# Patient Record
Sex: FEMALE | Race: WHITE
[De-identification: names, ages, dates, MRNs, and addresses within clinical notes are randomized per-mention and may not be internally consistent; named-entity substitution may affect disease eponyms.]

---

## 2017-02-24 ENCOUNTER — HOSPITAL ENCOUNTER (OUTPATIENT)
Dept: HOSPITAL 47 - LABWHC1 | Age: 22
Discharge: HOME | End: 2017-02-24
Payer: COMMERCIAL

## 2017-02-24 DIAGNOSIS — Z3A.00: ICD-10-CM

## 2017-02-24 DIAGNOSIS — Z34.82: Primary | ICD-10-CM

## 2017-02-24 LAB
CH: 29.8
CHCM: 33.7
ERYTHROCYTE [DISTWIDTH] IN BLOOD BY AUTOMATED COUNT: 3.96 M/UL (ref 3.8–5.4)
ERYTHROCYTE [DISTWIDTH] IN BLOOD: 14.3 % (ref 11.5–15.5)
GLUCOSE SERPL-MCNC: 99 MG/DL (ref 74–99)
HCT VFR BLD AUTO: 35.2 % (ref 34–46)
HDW: 2.68
HEPATITIS B SURFACE AG INDEX: 0.07
HGB BLD-MCNC: 11.5 GM/DL (ref 11.4–16)
MCH RBC QN AUTO: 29.1 PG (ref 25–35)
MCHC RBC AUTO-ENTMCNC: 32.7 G/DL (ref 31–37)
MCV RBC AUTO: 89 FL (ref 80–100)
NON-AFRICAN AMERICAN GFR(MDRD): >60
WBC # BLD AUTO: 8.6 K/UL (ref 4–11)

## 2017-02-24 PROCEDURE — 87389 HIV-1 AG W/HIV-1&-2 AB AG IA: CPT

## 2017-02-24 PROCEDURE — 86778 TOXOPLASMA ANTIBODY IGM: CPT

## 2017-02-24 PROCEDURE — 86901 BLOOD TYPING SEROLOGIC RH(D): CPT

## 2017-02-24 PROCEDURE — 86780 TREPONEMA PALLIDUM: CPT

## 2017-02-24 PROCEDURE — 86777 TOXOPLASMA ANTIBODY: CPT

## 2017-02-24 PROCEDURE — 86900 BLOOD TYPING SEROLOGIC ABO: CPT

## 2017-02-24 PROCEDURE — 36415 COLL VENOUS BLD VENIPUNCTURE: CPT

## 2017-02-24 PROCEDURE — 82565 ASSAY OF CREATININE: CPT

## 2017-02-24 PROCEDURE — 86850 RBC ANTIBODY SCREEN: CPT

## 2017-02-24 PROCEDURE — 87340 HEPATITIS B SURFACE AG IA: CPT

## 2017-02-24 PROCEDURE — 86762 RUBELLA ANTIBODY: CPT

## 2017-02-24 PROCEDURE — 85027 COMPLETE CBC AUTOMATED: CPT

## 2017-02-24 PROCEDURE — 82947 ASSAY GLUCOSE BLOOD QUANT: CPT

## 2017-02-25 LAB — T GONDII IGG SER-ACNC: <3 IU/ML (ref ?–7.2)

## 2017-02-27 LAB
HIV-1/HIV-2 AB SCREEN: (no result)
HIV1D: (no result)
HIV2D: (no result)

## 2017-03-02 ENCOUNTER — HOSPITAL ENCOUNTER (OUTPATIENT)
Dept: HOSPITAL 47 - RADUSWWP | Age: 22
End: 2017-03-02
Payer: COMMERCIAL

## 2017-03-02 DIAGNOSIS — O36.62X0: Primary | ICD-10-CM

## 2017-03-02 DIAGNOSIS — Z3A.18: ICD-10-CM

## 2017-03-02 PROCEDURE — 76811 OB US DETAILED SNGL FETUS: CPT

## 2017-03-02 NOTE — US
EXAMINATION TYPE: US OB fetal anatomy transabd

 

DATE OF EXAM: 3/2/2017 5:08 PM

 

COMPARISON: NONE

 

HISTORY: Anatomy exam

 

TECHNIQUE:  OBTA

 

EXAM MEASUREMENTS:

 

GESTATIONAL AGE / DATING

Physician Established: (19 weeks/0 days)

** EDC:  07/27/2017

Dates by LMP: unknown

Dates by First Scan:  (18 weeks/1 days)

** EDC: 08/02/2017

Dates by Current Scan for:  (18 weeks/2 days)

** EDC: 08/01/2017

 

FETAL SURVEY

IUP:  Single

PLACENTA: Posterior     

PREVIA: No previa   

** YOLANDA: 10.0 cm Normal

CERVICAL LENGTH (transabdominal: norm > 3.0cm): 4.8 cm

 

 

FETAL BIOMETRY

PRESENTATION:   Variable

BPD: 4.0 cm

**  18 weeks / 1 days

HC: 15.7 cm

**  18 weeks / 4 days

AC: 13.5 cm

**  19 weeks / 0 days

FL: 2.7 cm

**  18 weeks / 1 days

ESTIMATED FETAL WEIGHT IN GRAMS:  246 grams

ESTIMATED FETAL WEIGHT IN LBS/OZS:  0 lbs. 9 oz. 

WEIGHT PERCENTAGE BASED ON ESTABLISHED DATE:  22 %

** HC/AC: 1.2 Normal

** FL/AC: 19.8  Normal

HEART RATE:  157 bpm 

RHYTHM: Normal

 

ANATOMY SEEN (within normal limits): 

* Lateral Vent (< 1 cm) 0.6 cm

* Cisterna Magna (< 1.1 cm) 0.3 cm

* Nuchal Fold (< 0.6 cm) 0.3 cm

* Cerebellum (varies with age) 1.9 cm

Choroid Plexus (bilateral)

Midline Falx 

Cavus Septi Pellucidi   

Four Chamber Heart

Outflow tracts:  LVOT/RVOT

Stomach

Situs

Nose / Lips 

Diaphragm 

Kidneys (bilateral) 

Bladder

Cord Insert 

Three Vessel Cord 

Longitudinal Spine 

Transverse Spine 

Arms (bilateral)

Legs (bilateral)

 

 

 

 

 IMPRESSION:  Viable 18w2d fetus seen and appears wnl

## 2017-04-17 ENCOUNTER — HOSPITAL ENCOUNTER (OUTPATIENT)
Dept: HOSPITAL 47 - FBPOP | Age: 22
Discharge: HOME | End: 2017-04-17
Payer: COMMERCIAL

## 2017-04-17 VITALS
TEMPERATURE: 96.7 F | RESPIRATION RATE: 16 BRPM | DIASTOLIC BLOOD PRESSURE: 57 MMHG | HEART RATE: 91 BPM | SYSTOLIC BLOOD PRESSURE: 110 MMHG

## 2017-04-17 DIAGNOSIS — Z3A.25: ICD-10-CM

## 2017-04-17 DIAGNOSIS — O23.42: Primary | ICD-10-CM

## 2017-04-17 LAB
PARTICLE COUNT: (no result)
PH UR: 8 [PH] (ref 5–8)
SP GR UR: 1.02 (ref 1–1.03)
SQUAMOUS UR QL AUTO: 11 /HPF (ref 0–4)
UA BILLING (MACRO VS. MICRO): (no result)
UROBILINOGEN UR QL STRIP: <2 MG/DL (ref ?–2)
WBC #/AREA URNS HPF: 22 /HPF (ref 0–5)

## 2017-04-17 PROCEDURE — 99213 OFFICE O/P EST LOW 20 MIN: CPT

## 2017-04-17 PROCEDURE — 81001 URINALYSIS AUTO W/SCOPE: CPT

## 2017-04-18 NOTE — P.MSEPDOC
Presenting Problems





- Arrival Data


Date of Arrival on Unit: 17


Time of Arrival on Unit: 21:45


Mode of Transport: Wheelchair





- Complaint


OB-Reason for Admission/Chief Complaint: Pain


Comment: sharp, shooting, constant vaginal pain





Prenatal Medical History





- Pregnancy Information


: 2


Para: 1


Term: 1


: 0


Abortions: Spontaneous or Elective: 0


Number of Living Children: 1





- Gestational Age


Expected Date of Delivery: 17


Gestational Age by NOHEMI (wks/days): 25 Weeks and 0 Days





- Prenatal History


Pregnancy Complications: Prior , Smoker


Comment: has not seen Dr Suárez since 





Review of Systems





- Review of Systems


Constitutional: No problems


Breast: No problems


ENT: No problems


Cardiovascular: No problems


Respiratory: No problems


Gastrointestinal: No problems


Genitourinary: No problems


Musculoskeletal: No problems


Neurological: No problems


Skin: No problems





Vital Signs





- Temperature


Temperature: 96.7 F


Temperature Source: Temporal Artery Scan





- Pulse


  ** Right


Pulse Rate: 91


Pulse Assessment Method: Pulse Oximetry





- Respirations


Respiratory Rate: 16


O2 Sat by Pulse Oximetry: 98





- Blood Pressure


  ** Right Arm


Blood Pressure: 110/57


Blood Pressure Mean: 74


Blood Pressure Source: Automatic Cuff





Medical Screen Scoring (Pre)





- Cervical Exam


Dilation: 0 cm = 0





- Uterine Contractions


Frequency: N/A


Duration: N/A


Intensity: N/A





- Maternal Vital Signs


Maternal Temperature: N/A


Signs of Preeclampsia: N/A


Maternal Respirations: N/A





- Maternal Trauma


Maternal Trauma: N/A





- Fetal Assessment


Baseline FHR: 150


Fetal Heart Rate - NICHD Category: Category I (Normal) = 0


NST: Reactive


Fetal Position: N/A


Fetal Station: N/A





- Total Score


Total Score (Pre): 0





- Level of Risk


Level of Risk: Low (0-5)





Physician Notification (Pre)





- Physician Notified


Physician Notified Date: 17


Physician Notified Time: 21:55


Spoke With: Dr Mota





- Notification Comment


Comment: check cervix, send UA, obtain reactive fhts, d/c home if close and UA 

is negative





Medical Screen Scoring (Post)





- Cervical Exam


Dilation: Exam Deferred





- Uterine Contractions


Frequency: N/A


Duration: N/A


Intensity: N/A





- Maternal Vital Signs


Maternal Temperature: N/A


Signs of Preeclampsia: N/A


Maternal Respirations: N/A





- Maternal Trauma


Maternal Trauma: N/A





- Fetal Assessment


Fetal Heart Rate: 145


Fetal Heart Rate - NICHD Category: Category I (Normal) = 0


NST: Reactive





- Total Score


Total Score (Post): 0





- Post Treatment Level of Risk


Post Treatment Level of Risk: Low (0-5)





Physician Notification (Post)





- Physician Notified


Physician Notified Date: 17


Physician Notified Time: 22:37


Physician/Practitioner Notified:: Dr Mota


New Order Received: Yes





- Notification Comment


Comment: send UA for C&S, have pt  abx in am at her pharmacy, d/c home 

with instructions to increase water intake and call office to schedule appt 

with dr Suárez





Disposition





- Disposition


OB Disposition: Discharge to home


Discharge Date: 17


Discharge Time: 22:45


I agree with the RN Medical Screening Exam: Yes


Risk & Benefit of care provided described in d/c instruction: Yes


Diagnosis: INFECT OF PRT URINARY TRACT IN PREGNANCY, SECOND TRIMESTER

## 2017-05-28 NOTE — P.MSEPDOC
Presenting Problems





- Arrival Data


Date of Arrival on Unit: 17


Time of Arrival on Unit: 21:45


Mode of Transport: Wheelchair





- Complaint


OB-Reason for Admission/Chief Complaint: Pain


Comment: sharp, shooting, constant vaginal pain





Prenatal Medical History





- Pregnancy Information


: 2


Para: 1


Term: 1


: 0


Abortions: Spontaneous or Elective: 0


Number of Living Children: 1





- Gestational Age


Expected Date of Delivery: 17


Gestational Age by NOHEMI (wks/days): 30 Weeks and 5 Days





- Prenatal History


Pregnancy Complications: Prior , Smoker


Comment: has not seen Dr Suárez since 





Review of Systems





- Review of Systems


Constitutional: No problems


Breast: No problems


ENT: No problems


Cardiovascular: No problems


Respiratory: No problems


Gastrointestinal: No problems


Genitourinary: No problems


Musculoskeletal: No problems


Neurological: No problems


Skin: No problems





Vital Signs





- Temperature


Temperature: 96.7 F


Temperature Source: Temporal Artery Scan





- Pulse


  ** Right


Pulse Rate: 91


Pulse Assessment Method: Pulse Oximetry





- Respirations


Respiratory Rate: 16


O2 Sat by Pulse Oximetry: 98





- Blood Pressure


  ** Right Arm


Blood Pressure: 110/57


Blood Pressure Mean: 74


Blood Pressure Source: Automatic Cuff





Medical Screen Scoring (Pre)





- Cervical Exam


Dilation: 0 cm = 0





- Uterine Contractions


Frequency: N/A


Duration: N/A


Intensity: N/A





- Maternal Vital Signs


Maternal Temperature: N/A


Signs of Preeclampsia: N/A


Maternal Respirations: N/A





- Maternal Trauma


Maternal Trauma: N/A





- Fetal Assessment


Baseline FHR: 150


Fetal Heart Rate - NICHD Category: Category I (Normal) = 0


NST: Reactive


Fetal Position: N/A


Fetal Station: N/A





- Total Score


Total Score (Pre): 0





- Level of Risk


Level of Risk: Low (0-5)





Physician Notification (Pre)





- Physician Notified


Physician Notified Date: 17


Physician Notified Time: 21:55


Spoke With: Dr Mota





- Notification Comment


Comment: check cervix, send UA, obtain reactive fhts, d/c home if close and UA 

is negative





Medical Screen Scoring (Post)





- Cervical Exam


Dilation: Exam Deferred





- Uterine Contractions


Frequency: N/A


Duration: N/A


Intensity: N/A





- Maternal Vital Signs


Maternal Temperature: N/A


Signs of Preeclampsia: N/A


Maternal Respirations: N/A





- Maternal Trauma


Maternal Trauma: N/A





- Fetal Assessment


Fetal Heart Rate: 145


Fetal Heart Rate - NICHD Category: Category I (Normal) = 0


NST: Reactive





- Total Score


Total Score (Post): 0





- Post Treatment Level of Risk


Post Treatment Level of Risk: Low (0-5)





Physician Notification (Post)





- Physician Notified


Physician Notified Date: 17


Physician Notified Time: 22:37


Physician/Practitioner Notified:: Dr Mota


New Order Received: Yes





- Notification Comment


Comment: send UA for C&S, have pt  abx in am at her pharmacy, d/c home 

with instructions to increase water intake and call office to schedule appt 

with dr Suárez





Disposition





- Disposition


OB Disposition: Discharge to home


Discharge Date: 17


Discharge Time: 22:45


I agree with the RN Medical Screening Exam: Yes


Risk & Benefit of care provided described in d/c instruction: Yes


Diagnosis: 30 WEEKS GESTATION OF PREGNANCY

## 2017-06-02 ENCOUNTER — HOSPITAL ENCOUNTER (EMERGENCY)
Dept: HOSPITAL 47 - EC | Age: 22
Discharge: HOME | End: 2017-06-02
Payer: COMMERCIAL

## 2017-06-02 ENCOUNTER — HOSPITAL ENCOUNTER (OUTPATIENT)
Dept: HOSPITAL 47 - LABWHC1 | Age: 22
Discharge: HOME | End: 2017-06-02
Payer: COMMERCIAL

## 2017-06-02 VITALS
HEART RATE: 80 BPM | DIASTOLIC BLOOD PRESSURE: 57 MMHG | TEMPERATURE: 98.3 F | RESPIRATION RATE: 16 BRPM | SYSTOLIC BLOOD PRESSURE: 99 MMHG

## 2017-06-02 DIAGNOSIS — Z3A.30: ICD-10-CM

## 2017-06-02 DIAGNOSIS — F17.200: ICD-10-CM

## 2017-06-02 DIAGNOSIS — Z34.83: Primary | ICD-10-CM

## 2017-06-02 DIAGNOSIS — O99.89: Primary | ICD-10-CM

## 2017-06-02 DIAGNOSIS — M54.16: ICD-10-CM
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
## 2017-06-02 DIAGNOSIS — Z79.899: ICD-10-CM

## 2017-06-02 DIAGNOSIS — O99.333: ICD-10-CM

## 2017-06-02 LAB
CH: 29.2
CHCM: 33.1
ERYTHROCYTE [DISTWIDTH] IN BLOOD BY AUTOMATED COUNT: 3.41 M/UL (ref 3.8–5.4)
ERYTHROCYTE [DISTWIDTH] IN BLOOD: 13.5 % (ref 11.5–15.5)
HCT VFR BLD AUTO: 30.2 % (ref 34–46)
HDW: 2.94
HGB BLD-MCNC: 10.1 GM/DL (ref 11.4–16)
MCH RBC QN AUTO: 29.6 PG (ref 25–35)
MCHC RBC AUTO-ENTMCNC: 33.4 G/DL (ref 31–37)
MCV RBC AUTO: 88.5 FL (ref 80–100)
WBC # BLD AUTO: 8.3 K/UL (ref 4–11)

## 2017-06-02 PROCEDURE — 85027 COMPLETE CBC AUTOMATED: CPT

## 2017-06-02 PROCEDURE — 36415 COLL VENOUS BLD VENIPUNCTURE: CPT

## 2017-06-02 PROCEDURE — 82950 GLUCOSE TEST: CPT

## 2017-06-02 PROCEDURE — 99283 EMERGENCY DEPT VISIT LOW MDM: CPT

## 2017-06-02 NOTE — ED
Lower Extremity Injury HPI





- General


Chief Complaint: Extremity Injury, Lower


Stated Complaint: Fall-7 months preg


Time Seen by Provider: 17 13:40


Source: patient, RN notes reviewed


Mode of arrival: ambulatory


Limitations: no limitations





- History of Present Illness


Initial Comments: 





21-year-old female presents emergency Department with chief complaint of left 

hip pain.  Patient states that she fell 2 weeks ago when she tripped over a 

baby gait.  Patient states she felt her left hip but did not have any pain at 

that time.  She states over the last few days that she noticed that she's had 

pain that radiates from her left low back down her leg and feels like it's in 

her hip.  Patient denies any bowel bladder incontinence or retention.  Denies 

any abdominal pain.  She states that she feels baby kick in a regular basis.  

She has an appointment with her OB on Monday.  Patient had no vaginal bleeding 

no vaginal discharge.  Patient is  A0.  She's been taking Tylenol 

intermittently with minimal help.  She states that heat makes it worse but has 

not tried any stretching with it.





- Related Data


 Home Medications











 Medication  Instructions  Recorded  Confirmed


 


Pnv,Calcium 72/Iron/Folic Acid 1 tab PO DAILY 17





[Prenatal Plus Tablet]   











 Allergies











Allergy/AdvReac Type Severity Reaction Status Date / Time


 


No Known Allergies Allergy   Verified 17 13:01














Review of Systems


ROS Statement: 


Those systems with pertinent positive or pertinent negative responses have been 

documented in the HPI.





ROS Other: All systems not noted in ROS Statement are negative.





Past Medical History


Past Medical History: No Reported History


History of Any Multi-Drug Resistant Organisms: None Reported


Past Surgical History: Orthopedic Surgery


Past Anesthesia/Blood Transfusion Reactions: No Reported Reaction


Past Psychological History: No Psychological Hx Reported


Smoking Status: Current every day smoker


Past Alcohol Use History: None Reported


Past Drug Use History: None Reported





General Exam


Limitations: no limitations


General appearance: alert, in no apparent distress


Respiratory exam: Present: normal lung sounds bilaterally.  Absent: respiratory 

distress, wheezes, rales, rhonchi, stridor


Cardiovascular Exam: Present: regular rate, normal rhythm, normal heart sounds.

  Absent: systolic murmur, diastolic murmur, rubs, gallop, clicks


GI/Abdominal exam: Present: soft, normal bowel sounds, other (Abdomen 

appropriate for gestational age).  Absent: distended, tenderness, guarding, 

rebound, rigid


Extremities exam: Present: normal inspection, full ROM, normal capillary refill

, other (Lower extremity strength equal bilaterally, neurovascular intact 

negative Humble's test).  Absent: tenderness, pedal edema, joint swelling, 

calf tenderness


Back exam: Present: full ROM, tenderness (Mild tenderness left lumbar region), 

paraspinal tenderness, other (Pain with left straight leg raise).  Absent: CVA 

tenderness (R), CVA tenderness (L), vertebral tenderness


Skin exam: Present: warm, dry, intact, normal color.  Absent: rash





Course


 Vital Signs











  17





  12:56


 


Temperature 98.3 F


 


Pulse Rate 80


 


Respiratory 16





Rate 


 


Blood Pressure 99/57


 


O2 Sat by Pulse 99





Oximetry 














Medical Decision Making





- Medical Decision Making





21-year-old female presented for left low back pain, left hip pain.  Patient 

appears to have lumbar radiculopathy.  She's had immediate pain from her left 

low back down her leg.  She had a normal Humble's test with a positive 

straight leg raise.  Patient is advised to apply heat and ice and do daily 

stretching and continue acetaminophen.  She is to see her OB on Monday for 

further pain meds as approved by OB.





Disposition


Clinical Impression: 


 Lumbar radiculopathy, acute, Left hip pain





Disposition: HOME SELF-CARE


Condition: Stable


Instructions:  Lumbar Radiculopathy (ED)


Additional Instructions: 


Please return to the Emergency Department if symptoms worsen or any other 

concerns.


Referrals: 


None,Stated [Primary Care Provider] - 1-2 days


Time of Disposition: 13:54

## 2017-07-08 ENCOUNTER — HOSPITAL ENCOUNTER (OUTPATIENT)
Dept: HOSPITAL 47 - FBPOP | Age: 22
Discharge: HOME | End: 2017-07-08
Payer: COMMERCIAL

## 2017-07-08 VITALS
HEART RATE: 87 BPM | RESPIRATION RATE: 16 BRPM | TEMPERATURE: 96.6 F | DIASTOLIC BLOOD PRESSURE: 58 MMHG | SYSTOLIC BLOOD PRESSURE: 108 MMHG

## 2017-07-08 DIAGNOSIS — Z34.93: Primary | ICD-10-CM

## 2017-07-08 LAB
PARTICLE COUNT: 9844
PH UR: 6.5 [PH] (ref 5–8)
PROT UR QL: (no result)
RBC UR QL: 1 /HPF (ref 0–5)
SP GR UR: 1.02 (ref 1–1.03)
SQUAMOUS UR QL AUTO: 4 /HPF (ref 0–4)
UA BILLING (MACRO VS. MICRO): (no result)
UROBILINOGEN UR QL STRIP: 2 MG/DL (ref ?–2)
WBC #/AREA URNS HPF: 7 /HPF (ref 0–5)

## 2017-07-08 PROCEDURE — 81001 URINALYSIS AUTO W/SCOPE: CPT

## 2017-07-08 PROCEDURE — 59025 FETAL NON-STRESS TEST: CPT

## 2017-07-08 PROCEDURE — 87086 URINE CULTURE/COLONY COUNT: CPT

## 2017-07-08 PROCEDURE — 99213 OFFICE O/P EST LOW 20 MIN: CPT

## 2017-07-09 NOTE — P.MSEPDOC
Presenting Problems





- Arrival Data


Date of Arrival on Unit: 17


Time of Arrival on Unit: 10:00


Mode of Transport: Wheelchair





- Complaint


OB-Reason for Admission/Chief Complaint: Observation/Evaluation





Prenatal Medical History





- Pregnancy Information


: 2


Para: 1


Term: 1


: 0


Abortions: Spontaneous or Elective: 0


Number of Living Children: 1





- Gestational Age


Expected Date of Delivery: 17


Gestational Age by NOHEMI (wks/days): 37 Weeks and 3 Days





- Prenatal History


Pregnancy Complications: Prior 





Review of Systems





- Review of Systems


Constitutional: No problems


Breast: No problems


ENT: No problems


Cardiovascular: No problems


Respiratory: No problems


Gastrointestinal: No problems


Genitourinary: No problems


Musculoskeletal: No problems


Neurological: No problems


Skin: No problems





Vital Signs





- Temperature


Temperature: 96.6 F


Temperature Source: Temporal Artery Scan





- Pulse


  ** Right Sitting Brachial


Pulse Rate: 87


Pulse Assessment Method: Automatic Cuff





- Respirations


Respiratory Rate: 16


Oxygen Delivery Method: Room Air





- Blood Pressure


  ** Right Arm Sitting


Blood Pressure: 108/58


Blood Pressure Mean: 74


Blood Pressure Source: Automatic Cuff





Medical Screen Scoring (Pre)





- Cervical Exam


Dilation: 0 cm = 0


Effacement: Exam Deferred


Membranes: Intact





- Uterine Contractions


Frequency: > or = 36 weeks =2


Duration: > 40 seconds = 2


Intensity: N/A





- Maternal Vital Signs


Maternal Temperature: N/A


Maternal Blood Pressure: N/A


Signs of Preeclampsia: N/A


Maternal Respirations: N/A





- Maternal Trauma


Maternal Trauma: N/A





- Fetal Assessment


Baseline FHR: 155


Fetal Heart Rate - NICHD Category: Category I (Normal) = 0


NST: Reactive


Fetal Position: N/A


Fetal Station: N/A





- Total Score


Total Score (Pre): 4





- Level of Risk


Level of Risk: Low (0-5)





Physician Notification (Pre)





- Physician Notified


Physician Notified Date: 17


Physician Notified Time: 10:37


Physician/Practitioner Notifed:: dr vinnie Mayen Order Received: Yes





Disposition





- Disposition


Discharge Date: 17


Discharge Time: 11:30


I agree with the RN Medical Screening Exam: Yes


Risk & Benefit of care provided described in d/c instruction: Yes


Diagnosis: 37 WEEKS GESTATION OF PREGNANCY

## 2017-07-20 ENCOUNTER — HOSPITAL ENCOUNTER (INPATIENT)
Dept: HOSPITAL 47 - 4FBP | Age: 22
LOS: 3 days | Discharge: HOME | End: 2017-07-23
Payer: COMMERCIAL

## 2017-07-20 VITALS — BODY MASS INDEX: 35.6 KG/M2

## 2017-07-20 DIAGNOSIS — F17.200: ICD-10-CM

## 2017-07-20 DIAGNOSIS — O34.211: Primary | ICD-10-CM

## 2017-07-20 DIAGNOSIS — Z3A.39: ICD-10-CM

## 2017-07-20 LAB
BASOPHILS # BLD AUTO: 0 K/UL (ref 0–0.2)
BASOPHILS NFR BLD AUTO: 0 %
CH: 27.9
CHCM: 32.6
EOSINOPHIL # BLD AUTO: 0.1 K/UL (ref 0–0.7)
EOSINOPHIL NFR BLD AUTO: 1 %
ERYTHROCYTE [DISTWIDTH] IN BLOOD BY AUTOMATED COUNT: 3.49 M/UL (ref 3.8–5.4)
ERYTHROCYTE [DISTWIDTH] IN BLOOD: 15.3 % (ref 11.5–15.5)
HCT VFR BLD AUTO: 30 % (ref 34–46)
HDW: 3.02
HGB BLD-MCNC: 9.7 GM/DL (ref 11.4–16)
LUC NFR BLD AUTO: 2 %
LYMPHOCYTES # SPEC AUTO: 2 K/UL (ref 1–4.8)
LYMPHOCYTES NFR SPEC AUTO: 20 %
MCH RBC QN AUTO: 27.7 PG (ref 25–35)
MCHC RBC AUTO-ENTMCNC: 32.2 G/DL (ref 31–37)
MCV RBC AUTO: 86 FL (ref 80–100)
MONOCYTES # BLD AUTO: 0.5 K/UL (ref 0–1)
MONOCYTES NFR BLD AUTO: 5 %
NEUTROPHILS # BLD AUTO: 7.3 K/UL (ref 1.3–7.7)
NEUTROPHILS NFR BLD AUTO: 72 %
WBC # BLD AUTO: 0.25 10*3/UL
WBC # BLD AUTO: 10.2 K/UL (ref 4–11)
WBC (PEROX): 10.55

## 2017-07-20 PROCEDURE — 86900 BLOOD TYPING SEROLOGIC ABO: CPT

## 2017-07-20 PROCEDURE — 86850 RBC ANTIBODY SCREEN: CPT

## 2017-07-20 PROCEDURE — 88307 TISSUE EXAM BY PATHOLOGIST: CPT

## 2017-07-20 PROCEDURE — 85025 COMPLETE CBC W/AUTO DIFF WBC: CPT

## 2017-07-20 PROCEDURE — 86901 BLOOD TYPING SEROLOGIC RH(D): CPT

## 2017-07-20 RX ADMIN — KETOROLAC TROMETHAMINE SCH MG: 30 INJECTION, SOLUTION INTRAMUSCULAR at 20:40

## 2017-07-20 RX ADMIN — POTASSIUM CHLORIDE SCH: 14.9 INJECTION, SOLUTION INTRAVENOUS at 21:08

## 2017-07-20 RX ADMIN — POTASSIUM CHLORIDE SCH: 14.9 INJECTION, SOLUTION INTRAVENOUS at 21:07

## 2017-07-20 RX ADMIN — DOCUSATE SODIUM AND SENNOSIDES SCH EACH: 50; 8.6 TABLET ORAL at 20:40

## 2017-07-20 RX ADMIN — POTASSIUM CHLORIDE SCH MLS/HR: 14.9 INJECTION, SOLUTION INTRAVENOUS at 17:30

## 2017-07-20 NOTE — P.OP
Date of Procedure: 17


Preoperative Diagnosis: 


Intrauterine pregnancy at term: Prior  section


Postoperative Diagnosis: 


Same


Procedure(s) Performed: 


Repeat low transverse  section


Implants: 





Anesthesia: SEAN


Surgeon: Oswaldo Suárez


Assistant #1: Callie Roberson


Estimated Blood Loss (ml): 400


IV fluids (ml): 1,000


Urine output (ml): 100


Pathology: other (placenta)


Condition: stable


Disposition: floor


Indications for Procedure: 





Operative Findings: 


Viable male  Apgar's of 8 and 9 at one and 5 minutes  weight was 7 lbs. 

4 oz.


Description of Procedure: 


Refer was taken to the operating suite where a final anesthetic was found to be 

in adequate.  She had pain even with light touch and a decision to convert to 

general anesthetic was made.  Once under general anesthetic skin incision was 

made and this incision was carried through to underlying layer of the fascia.  

Fascia was then nicked in midline and this opening was extended laterally with 

Pineda scissors.  Superior and inferior aspect of this incision were then grasped 

tented up and bluntly and sharply dissected off the rectus muscles.  Rectus 

muscles were then divided the midline and sharp dissection through the 

peritoneum was made.  This opening was then extended superiorly and inferiorly 

with good visualization of both bowel bladder.  Bladder blade was then placed 

and the vesicouterine peritoneum was identified.  It was entered with 

Metzenbaum scissors and the opening was extended across the face the uterus 

with Metzenbaum scissors and the bladder flap was digitally created.  If was 

then used to incise uterus this opening was then fully created with a hemostat 

and then extended bluntly.  Head was then H medically delivered and mouth nares 

were bulb suctioned.  Nuchal cord 1 was easily reduced.  Anterior and 

posterior shoulders were then delivered with gentle downward upper traction 

followed by the remainder the baby.  Umbilical cord was then clamped cut usual 

fashion an nursery personnel was present to assume care.  Placenta was then 

delivered intact and Pitocin was added to the IV.  Uterus was then exteriorized 

cleared of clots and debris and closed in 1 layer with 0 Vicryl suture.  Once 

excellent hemostasis was obtained 3-0 Vicryl was used to reapproximate the 

bladder flap.  Blood and debris was then suctioned from the posterior cul-de-

sac and uterus was reinserted the abdomen.  Peritoneal layer was then closed 

Lobac suture fascial layer was closed Lobac suture.  Once this was accomplished 

the skin incision was then revised as she had a large keloid from her previous 

 section which was excised and then 3-0 Vicryl was used to 

reapproximate the skin and then the skin was closed with 3-0 Vicryl on a Jevon 

needle.  Sponge, lap, needle counts were all correct 2.  Patient was taken to 

the recovery room in stable and satisfactory condition.

## 2017-07-20 NOTE — P.HPOB
History of Present Illness


H&P Date: 17


Chief Complaint: Uterine pregnancy at term: Prior  section





Artur is a 21-year-old  at 39 weeks gestation with prior  section.

  She refuses  and would prefer to have a repeat  section risks/

benefits/alternatives to this procedure were discussed the patient in detail 

all questions are answered for her prior to proceeding to the operative room.  

Her pregnancy course otherwise has been unremarkable and she is feeling well at 

this time she is had no competitions or issues with the pregnancy.  Pertinent 

labs do include belly is immune, hepatitis B surface antigen and RPR were 

negative and A+ blood type.  Group B strep was also negative.  On physical exam 

vital signs are stable and afebrile.  Heart regular, lungs clear, extremities 

without pain.  Osteopathic exam is unremarkable.  Abdomen soft gravid uterus is 

noted fetal heart tones in the 140s prior to seeing to the operating room.  

Kali intrauterine pregnancy at term.  Plan repeat low transverse  

section





Past Medical History


Past Medical History: No Reported History


History of Any Multi-Drug Resistant Organisms: None Reported


Past Surgical History:  Section, Orthopedic Surgery


Past Anesthesia/Blood Transfusion Reactions: No Reported Reaction


Additional Past Anesthesia/Blood Transfusion Reaction / Comment(s): no hx blood 

transfusion


Past Psychological History: No Psychological Hx Reported


Smoking Status: Current every day smoker


Past Alcohol Use History: None Reported


Additional Past Alcohol Use History / Comment(s): started smoking 18,<1/2ppd


Past Drug Use History: None Reported





- Past Family History


  ** Mother


Family Medical History: No Reported History





  ** Father


Family Medical History: No Reported History





Medications and Allergies


 Home Medications











 Medication  Instructions  Recorded  Confirmed  Type


 


Pnv,Calcium 72/Iron/Folic Acid 1 tab PO DAILY 17 History





[Prenatal Plus Tablet]    











 Allergies











Allergy/AdvReac Type Severity Reaction Status Date / Time


 


No Known Allergies Allergy   Verified 17 10:41














Exam


Osteopathic Statement: *.  No significant issues noted on an osteopathic 

structural exam other than those noted in the History and Physical/Consult.





- Vital Signs


Vital signs: 


 Vital Signs











  Temp Pulse Resp BP


 


 17 10:40  96.7 F L  83  16  105/54








 Intake and Output











 17





 22:59 06:59 14:59


 


Other:   


 


  Weight   94.347 kg








 Patient Weight











 17





 06:59


 


Weight 94.347 kg














Results


Result Diagrams: 


 17 11:00





 Abnormal Lab Results - Last 24 Hours (Table)











  17 Range/Units





  11:00 


 


RBC  3.49 L  (3.80-5.40)  m/uL


 


Hgb  9.7 L  (11.4-16.0)  gm/dL


 


Hct  30.0 L  (34.0-46.0)  %

## 2017-07-21 LAB
BASOPHILS # BLD AUTO: 0 K/UL (ref 0–0.2)
BASOPHILS NFR BLD AUTO: 0 %
CH: 27.9
CHCM: 32.1
EOSINOPHIL # BLD AUTO: 0.1 K/UL (ref 0–0.7)
EOSINOPHIL NFR BLD AUTO: 1 %
ERYTHROCYTE [DISTWIDTH] IN BLOOD BY AUTOMATED COUNT: 3.3 M/UL (ref 3.8–5.4)
ERYTHROCYTE [DISTWIDTH] IN BLOOD: 15.3 % (ref 11.5–15.5)
HCT VFR BLD AUTO: 28.7 % (ref 34–46)
HDW: 2.97
HGB BLD-MCNC: 9.5 GM/DL (ref 11.4–16)
LUC NFR BLD AUTO: 2 %
LYMPHOCYTES # SPEC AUTO: 2.5 K/UL (ref 1–4.8)
LYMPHOCYTES NFR SPEC AUTO: 17 %
MCH RBC QN AUTO: 28.8 PG (ref 25–35)
MCHC RBC AUTO-ENTMCNC: 33 G/DL (ref 31–37)
MCV RBC AUTO: 87.1 FL (ref 80–100)
MONOCYTES # BLD AUTO: 0.7 K/UL (ref 0–1)
MONOCYTES NFR BLD AUTO: 5 %
NEUTROPHILS # BLD AUTO: 10.9 K/UL (ref 1.3–7.7)
NEUTROPHILS NFR BLD AUTO: 75 %
WBC # BLD AUTO: 0.27 10*3/UL
WBC # BLD AUTO: 14.5 K/UL (ref 4–11)
WBC (PEROX): 13.9

## 2017-07-21 RX ADMIN — DOCUSATE SODIUM AND SENNOSIDES SCH EACH: 50; 8.6 TABLET ORAL at 19:35

## 2017-07-21 RX ADMIN — ACETAMINOPHEN AND CODEINE PHOSPHATE PRN EACH: 300; 30 TABLET ORAL at 14:21

## 2017-07-21 RX ADMIN — IBUPROFEN PRN MG: 600 TABLET ORAL at 12:13

## 2017-07-21 RX ADMIN — ACETAMINOPHEN AND CODEINE PHOSPHATE PRN EACH: 300; 30 TABLET ORAL at 07:50

## 2017-07-21 RX ADMIN — KETOROLAC TROMETHAMINE SCH: 30 INJECTION, SOLUTION INTRAMUSCULAR at 13:31

## 2017-07-21 RX ADMIN — KETOROLAC TROMETHAMINE SCH MG: 30 INJECTION, SOLUTION INTRAMUSCULAR at 02:38

## 2017-07-21 RX ADMIN — ACETAMINOPHEN AND CODEINE PHOSPHATE PRN EACH: 300; 30 TABLET ORAL at 15:36

## 2017-07-21 RX ADMIN — ACETAMINOPHEN AND CODEINE PHOSPHATE PRN EACH: 300; 30 TABLET ORAL at 22:06

## 2017-07-21 RX ADMIN — DOCUSATE SODIUM AND SENNOSIDES SCH EACH: 50; 8.6 TABLET ORAL at 07:50

## 2017-07-21 RX ADMIN — POTASSIUM CHLORIDE SCH: 14.9 INJECTION, SOLUTION INTRAVENOUS at 04:25

## 2017-07-21 RX ADMIN — KETOROLAC TROMETHAMINE SCH: 30 INJECTION, SOLUTION INTRAMUSCULAR at 19:46

## 2017-07-21 RX ADMIN — IBUPROFEN PRN MG: 600 TABLET ORAL at 18:25

## 2017-07-21 NOTE — P.PNOBGPC
Subjective





- Subjective


Patient reports: Reports appetite normal, Reports voiding normally, Reports 

pain well controlled, Reports ambulating normally


Bennett: doing well





Objective





- Vital Signs


Latest vital signs: 


 Vital Signs











  Temp Pulse Resp BP Pulse Ox


 


 17 07:57  97.9 F  71  16  116/62 


 


 17 04:00  98 F  64  15  98/52 


 


 17 00:00  98 F  67  18  102/49  98


 


 17 20:00  98.1 F  67  18  102/49 


 


 17 15:25  96.5 F L  82  16  103/54 


 


 17 14:45   83  16  108/56  93 L


 


 17 14:15   82  16  123/64 


 


 17 14:00   80  16  112/59  94 L


 


 17 13:45   80  16  108/64 


 


 17 13:30   84  16  114/65  93 L


 


 17 13:15  97.2 F L  77  16  120/62 


 


 17 13:08      93 L


 


 17 10:40  96.7 F L  83  16  105/54 








 Intake and Output











 17





 22:59 06:59 14:59


 


Intake Total 1200  


 


Output Total 800 1200 700


 


Balance 400 -1200 -700


 


Intake:   


 


  Oral 1200  


 


Output:   


 


  Urine 800 1200 700


 


Other:   


 


  # Voids  1 1














- Exam


Lungs: bilateral: normal


Chest: Normal S1, Normal S2


Extremities: Present: normal


Abdomen: Present: normal appearance, soft.  Absent: distention, tenderness


Incision: Present: normal, dry, intact


Uterus: Present: normal, firm





- Labs


Labs: 


 Abnormal Lab Results - Last 24 Hours (Table)











  17 Range/Units





  11:00 07:23 


 


WBC   14.5 H  (4.0-11.0)  k/uL


 


RBC  3.49 L  3.30 L  (3.80-5.40)  m/uL


 


Hgb  9.7 L  9.5 L  (11.4-16.0)  gm/dL


 


Hct  30.0 L  28.7 L  (34.0-46.0)  %


 


Neutrophils #   10.9 H  (1.3-7.7)  k/uL

## 2017-07-21 NOTE — P.PN
Progress Note - Text


Date: 2017 Time: 707


The patient is status post  section


Vital signs stable


VAS: 0-10


Patient has no complaints of pain.  The patient incurred some minimal itching 

yesterday, this itching is  now subsiding.


Pain meds to be managed by service.

## 2017-07-22 VITALS — RESPIRATION RATE: 16 BRPM

## 2017-07-22 RX ADMIN — IBUPROFEN PRN MG: 600 TABLET ORAL at 08:38

## 2017-07-22 RX ADMIN — IBUPROFEN PRN MG: 600 TABLET ORAL at 16:59

## 2017-07-22 RX ADMIN — DOCUSATE SODIUM AND SENNOSIDES SCH: 50; 8.6 TABLET ORAL at 21:46

## 2017-07-22 RX ADMIN — ACETAMINOPHEN AND CODEINE PHOSPHATE PRN EACH: 300; 30 TABLET ORAL at 13:55

## 2017-07-22 RX ADMIN — ACETAMINOPHEN AND CODEINE PHOSPHATE PRN EACH: 300; 30 TABLET ORAL at 23:56

## 2017-07-22 RX ADMIN — DOCUSATE SODIUM AND SENNOSIDES SCH EACH: 50; 8.6 TABLET ORAL at 08:38

## 2017-07-22 NOTE — P.PNOBGPC
Subjective





- Subjective


Principal diagnosis: Postoperative day 2


Interval history: 





Doing very well.  Complaints of back pain at spinal site and incisional pain 

only.


Patient reports: Reports appetite normal, Reports voiding normally, Reports 

pain well controlled, Reports ambulating normally


[place]sey: doing well





Objective





- Vital Signs


Latest vital signs: 


 Vital Signs











  Temp Pulse Resp BP Pulse Ox


 


 17 08:00  97.2 F L  84  16  117/67 


 


 17 00:00  98.3 F  74  16  112/49 


 


 17 15:25  98.4 F  84  18  103/56  95


 


 17 12:00  98.1 F  75  16  105/56  96








 Intake and Output











 17





 22:59 06:59 14:59


 


Other:   


 


  # Voids 1 1 1














- Exam


Lungs: bilateral: normal


Chest: Normal S1, Normal S2


Extremities: Present: normal


Abdomen: Present: normal appearance, soft.  Absent: distention, tenderness


Incision: Present: normal, dry, intact


Uterus: Present: normal, firm

## 2017-07-23 VITALS — TEMPERATURE: 98.5 F | SYSTOLIC BLOOD PRESSURE: 111 MMHG | HEART RATE: 77 BPM | DIASTOLIC BLOOD PRESSURE: 63 MMHG

## 2017-07-23 RX ADMIN — ACETAMINOPHEN AND CODEINE PHOSPHATE PRN EACH: 300; 30 TABLET ORAL at 06:28

## 2017-07-23 RX ADMIN — DOCUSATE SODIUM AND SENNOSIDES SCH: 50; 8.6 TABLET ORAL at 07:59

## 2017-07-23 NOTE — P.PNOBGPC
Subjective





- Subjective


Patient reports: Reports appetite normal, Reports voiding normally, Reports 

pain well controlled, Reports ambulating normally


[place]: doing well





Objective





- Vital Signs


Latest vital signs: 


 Vital Signs











  Temp Pulse Resp BP


 


 17 00:00  98.1 F  72  16  112/69


 


 17 16:00  98.6 F  84  16  108/62


 


 17 08:00  97.2 F L  84  16  117/67








 Intake and Output











 17





 14:59 22:59 06:59


 


Other:   


 


  # Voids 1 1 2














- Exam


Lungs: bilateral: normal


Chest: Normal S1, Normal S2


Extremities: Present: normal


Abdomen: Present: normal appearance, soft.  Absent: distention, tenderness


Incision: Present: normal, dry, intact


Uterus: Present: normal, firm





Assessment and Plan


(1)  delivery delivered


Narrative/Plan: 


Postoperative day #3.  Patient is resting without new complaints.  Vital signs 

are stable and she is afebrile.  Uterus is firm nontender she's having normal 

lochia.  Her incision is intact and dry.  My impression is that this is a 

normal postoperative course.  Plan is to continue routine postoperative care 

and discharge home later today.


Current Visit: Yes   Status: Acute   Code(s): O82 - ENCOUNTER FOR  

DELIVERY WITHOUT INDICATION   SNOMED Code(s): 911371211

## 2017-07-23 NOTE — P.DS
Providers


Date of admission: 


17 10:12





Expected date of discharge: 17


Attending physician: 


Oswaldo Suárez





Primary care physician: 


Stated None








- Discharge Diagnosis(es)


(1)  delivery delivered


Current Visit: Yes   Status: Acute   


Hospital Course: 





Please see dictated H&P per Dr. Solares on this patient's admission.  Brief 

summary this is a 21-year-old  2 para 1 female 39 weeks gestation 

admitted to labor and delivery for elective repeat  section.  She 

undergoes above-named surgery on .  Postoperative patient does well and 

on postoperative 3 spell to be stable for discharge home follow up with Dr. Solares in approximately 1 week for an incision check.


Procedures: 





Repeat low transverse  section.


Patient Condition at Discharge: Good





Plan - Discharge Summary


New Discharge Prescriptions: 


New


   Acetaminophen-Codeine 300-30mg [Tylenol #3] 1 tab PO Q4H PRN #30 tablet


     PRN Reason: Pain


   Ibuprofen [Motrin] 600 mg PO Q6HR PRN #30 tab


     PRN Reason: Pain





No Action


   Pnv,Calcium 72/Iron/Folic Acid [Prenatal Plus Tablet] 1 tab PO DAILY


Discharge Medication List





Pnv,Calcium 72/Iron/Folic Acid [Prenatal Plus Tablet] 1 tab PO DAILY 17 [

History]


Acetaminophen-Codeine 300-30mg [Tylenol #3] 1 tab PO Q4H PRN #30 tablet  [Rx]


Ibuprofen [Motrin] 600 mg PO Q6HR PRN #30 tab 17 [Rx]








Follow up Appointment(s)/Referral(s): 


Oswaldo Suárez DO [Doctor of Osteopathic Medicine] - 17 10:15 am (Patient 

also has a postpartum visit on  at 10 AM.)


Activity/Diet/Wound Care/Special Instructions: 


O heavy lifting, limit stairs and driving and pelvic rest.  If any high 

temperatures, heavy bleeding, or severe pain call my office.


Discharge Disposition: HOME SELF-CARE

## 2018-03-09 ENCOUNTER — HOSPITAL ENCOUNTER (EMERGENCY)
Dept: HOSPITAL 47 - EC | Age: 23
Discharge: HOME | End: 2018-03-09
Payer: COMMERCIAL

## 2018-03-09 VITALS
RESPIRATION RATE: 18 BRPM | SYSTOLIC BLOOD PRESSURE: 136 MMHG | DIASTOLIC BLOOD PRESSURE: 75 MMHG | TEMPERATURE: 97.8 F | HEART RATE: 83 BPM

## 2018-03-09 DIAGNOSIS — F17.200: ICD-10-CM

## 2018-03-09 DIAGNOSIS — J20.9: Primary | ICD-10-CM

## 2018-03-09 DIAGNOSIS — Z79.899: ICD-10-CM

## 2018-03-09 PROCEDURE — 71046 X-RAY EXAM CHEST 2 VIEWS: CPT

## 2018-03-09 PROCEDURE — 99283 EMERGENCY DEPT VISIT LOW MDM: CPT

## 2018-03-09 NOTE — ED
General Adult HPI





- General


Chief complaint: Upper Respiratory Infection


Stated complaint: Cough


Time Seen by Provider: 18 13:30


Source: patient, RN notes reviewed


Mode of arrival: ambulatory


Limitations: no limitations





- History of Present Illness


Initial comments: 





22-year-old female presents to the emergency Department chief complaint of 

cough.  She's been sick for about a week and half.  It started as sinus 

pressure.  Now she's developed this cough.  She states someone else in the home 

has pneumonia.  She does admit to smoking.  She states she has felt like fevers 

on and off for that.  She denies any nausea vomiting.  She was concerned due to 

the continued cough so she thought that she should be seen. Patient denies any 

recent chest pain, back pain, abdominal pain, nausea vomiting, numbness or 

tingling, dysuria or hematuria, constipation or diarrhea, headaches or visual 

changes, or any other current symptoms.





- Related Data


 Home Medications











 Medication  Instructions  Recorded  Confirmed


 


Pnv,Calcium 72/Iron/Folic Acid 1 tab PO DAILY 17





[Prenatal Plus Tablet]   








 Previous Rx's











 Medication  Instructions  Recorded


 


Acetaminophen-Codeine 300-30mg 1 tab PO Q4H PRN #30 tablet 17





[Tylenol #3]  


 


Ibuprofen [Motrin] 600 mg PO Q6HR PRN #30 tab 17


 


Amoxicillin/Potassium Clav 1 tab PO Q12HR #20 tab 18





[Augmentin 875-125 Tablet]  


 


predniSONE 50 mg PO DAILY #5 tab 18











 Allergies











Allergy/AdvReac Type Severity Reaction Status Date / Time


 


No Known Allergies Allergy   Verified 18 13:40














Review of Systems


ROS Statement: 


Those systems with pertinent positive or pertinent negative responses have been 

documented in the HPI.





ROS Other: All systems not noted in ROS Statement are negative.





Past Medical History


Past Medical History: No Reported History


History of Any Multi-Drug Resistant Organisms: None Reported


Past Surgical History:  Section, Orthopedic Surgery


Past Anesthesia/Blood Transfusion Reactions: No Reported Reaction


Additional Past Anesthesia/Blood Transfusion Reaction / Comment(s): no hx blood 

transfusion


Past Psychological History: Anxiety


Smoking Status: Current every day smoker


Past Alcohol Use History: None Reported


Past Drug Use History: None Reported





- Past Family History


  ** Mother


Family Medical History: No Reported History





  ** Father


Family Medical History: No Reported History





General Exam





- General Exam Comments


Initial Comments: 





General exam: Alert, active, comfortable in no apparent distress


Head: Normocephalic 


Eyes: Normal reaction of pupils, equal size, normal range of extraocular motion


Ears: normal external ear canals, pink tympanic membranes with normal cone of 

light


Nose: clear with pink turbinates


Throat: no erythema or exudates with normal sized tonsils


Neck: no masses, no nuchal rigidity


Chest: no chest wall deformity


Lungs: equal air entry with no crackles or wheeze


CVS: S1 and S2 normal with no audible mumurs, regular rhythm


Abdomen: no hepatosplenomegaly, normal  bowel sounds, no guarding or rigidity


Spine: no scoliosis or deformity


Skin: no rashes


Neurological: No focal deficits, tone is normal in all 4 extremities


Limitations: no limitations





Course


 Vital Signs











  18





  13:40


 


Temperature 97.8 F


 


Pulse Rate 83


 


Respiratory 18





Rate 


 


Blood Pressure 136/75


 


O2 Sat by Pulse 99





Oximetry 














- Reevaluation(s)


Reevaluation #1: 





18 14:33


We did discuss risk-benefit of medications with breast-feeding.





Medical Decision Making





- Medical Decision Making





23 yo female presents to the ER with cc of cough.  At this time patient x-rays 

reviewed and negative.  This and we discussed most likely a bronchitis.  Due to 

the length of time that she has been ill we will place on antibiotic as well.  

We did discuss follow-up return parameters all questions.  Patient is in 

agreement this plan all questions have been answered.  This time patient will 

be discharged.





- Radiology Data


Radiology results: report reviewed, image reviewed





Disposition


Clinical Impression: 


 Acute bronchitis





Disposition: HOME SELF-CARE


Condition: Stable


Instructions:  Acute Bronchitis (ED)


Additional Instructions: 


Please use medication as discussed. Please follow up with family doctor if 

symptoms have not improved over the next two days. Please return to the 

emergency room if your symptoms increase or worsen or for any other concerns. 


Prescriptions: 


Amoxicillin/Potassium Clav [Augmentin 875-125 Tablet] 1 tab PO Q12HR #20 tab


predniSONE 50 mg PO DAILY #5 tab


Referrals: 


Nadja Castle MD [REFERRING] - 1-2 days


Time of Disposition: 14:33

## 2018-03-09 NOTE — XR
EXAMINATION TYPE: XR chest 2V

 

DATE OF EXAM: 3/9/2018

 

COMPARISON: NONE

 

HISTORY: Cough and congestion

 

TECHNIQUE:  Frontal and lateral views of the chest are obtained.

 

FINDINGS:  There is no focal air space opacity, pleural effusion, or pneumothorax seen.  The cardiac 
silhouette size is within normal limits. There is bronchial wall thickening. There may be a spinal cu
rvature.  The osseous structures are intact.

 

IMPRESSION:  Correlate for bronchitis, reactive airways disease, follow-up as indicated.

## 2018-09-09 ENCOUNTER — HOSPITAL ENCOUNTER (EMERGENCY)
Dept: HOSPITAL 47 - EC | Age: 23
Discharge: HOME | End: 2018-09-09
Payer: COMMERCIAL

## 2018-09-09 VITALS
TEMPERATURE: 98.5 F | RESPIRATION RATE: 20 BRPM | HEART RATE: 90 BPM | DIASTOLIC BLOOD PRESSURE: 63 MMHG | SYSTOLIC BLOOD PRESSURE: 107 MMHG

## 2018-09-09 DIAGNOSIS — F17.200: ICD-10-CM

## 2018-09-09 DIAGNOSIS — S83.92XA: Primary | ICD-10-CM
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
## 2018-09-09 PROCEDURE — 99283 EMERGENCY DEPT VISIT LOW MDM: CPT

## 2018-09-09 PROCEDURE — 73562 X-RAY EXAM OF KNEE 3: CPT

## 2018-09-09 NOTE — XR
EXAMINATION TYPE: XR knee complete LT , 3 VIEWS

 

DATE OF EXAM ORDERED: 9/9/2018

 

HISTORY: Pain.

 

COMPARISON: None.

 

FINDINGS:  No fracture or dislocation is seen. The study is such that I cannot exclude a joint effusi
on.

 

IMPRESSION: 

 

NO ACUTE OSSEOUS LESION.
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
## 2019-01-10 ENCOUNTER — HOSPITAL ENCOUNTER (EMERGENCY)
Dept: HOSPITAL 47 - EC | Age: 24
LOS: 1 days | Discharge: HOME | End: 2019-01-11
Payer: COMMERCIAL

## 2019-01-10 DIAGNOSIS — R10.2: ICD-10-CM

## 2019-01-10 DIAGNOSIS — T76.21XA: Primary | ICD-10-CM

## 2019-01-10 DIAGNOSIS — F17.200: ICD-10-CM

## 2019-01-10 PROCEDURE — 99284 EMERGENCY DEPT VISIT MOD MDM: CPT

## 2019-01-11 VITALS
RESPIRATION RATE: 19 BRPM | DIASTOLIC BLOOD PRESSURE: 83 MMHG | SYSTOLIC BLOOD PRESSURE: 118 MMHG | TEMPERATURE: 98.6 F | HEART RATE: 84 BPM
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
## 2020-01-14 ENCOUNTER — HOSPITAL ENCOUNTER (EMERGENCY)
Dept: HOSPITAL 47 - EC | Age: 25
Discharge: HOME | End: 2020-01-14
Payer: COMMERCIAL

## 2020-01-14 VITALS
SYSTOLIC BLOOD PRESSURE: 113 MMHG | DIASTOLIC BLOOD PRESSURE: 49 MMHG | HEART RATE: 77 BPM | RESPIRATION RATE: 18 BRPM | TEMPERATURE: 97.9 F

## 2020-01-14 DIAGNOSIS — F17.200: ICD-10-CM

## 2020-01-14 DIAGNOSIS — R07.89: Primary | ICD-10-CM

## 2020-01-14 LAB
ALBUMIN SERPL-MCNC: 3.9 G/DL (ref 3.5–5)
ALP SERPL-CCNC: 68 U/L (ref 38–126)
ALT SERPL-CCNC: 12 U/L (ref 4–34)
AMYLASE SERPL-CCNC: 49 U/L (ref 30–110)
ANION GAP SERPL CALC-SCNC: 7 MMOL/L
AST SERPL-CCNC: 18 U/L (ref 14–36)
BASOPHILS # BLD AUTO: 0 K/UL (ref 0–0.2)
BASOPHILS NFR BLD AUTO: 0 %
BUN SERPL-SCNC: 9 MG/DL (ref 7–17)
CALCIUM SPEC-MCNC: 9.1 MG/DL (ref 8.4–10.2)
CHLORIDE SERPL-SCNC: 109 MMOL/L (ref 98–107)
CO2 SERPL-SCNC: 22 MMOL/L (ref 22–30)
EOSINOPHIL # BLD AUTO: 0 K/UL (ref 0–0.7)
EOSINOPHIL NFR BLD AUTO: 0 %
ERYTHROCYTE [DISTWIDTH] IN BLOOD BY AUTOMATED COUNT: 4.1 M/UL (ref 3.8–5.4)
ERYTHROCYTE [DISTWIDTH] IN BLOOD: 12.7 % (ref 11.5–15.5)
GLUCOSE SERPL-MCNC: 105 MG/DL (ref 74–99)
HCT VFR BLD AUTO: 36.9 % (ref 34–46)
HGB BLD-MCNC: 12.1 GM/DL (ref 11.4–16)
LYMPHOCYTES # SPEC AUTO: 1.2 K/UL (ref 1–4.8)
LYMPHOCYTES NFR SPEC AUTO: 10 %
MCH RBC QN AUTO: 29.4 PG (ref 25–35)
MCHC RBC AUTO-ENTMCNC: 32.7 G/DL (ref 31–37)
MCV RBC AUTO: 90.1 FL (ref 80–100)
MONOCYTES # BLD AUTO: 0.4 K/UL (ref 0–1)
MONOCYTES NFR BLD AUTO: 3 %
NEUTROPHILS # BLD AUTO: 10.9 K/UL (ref 1.3–7.7)
NEUTROPHILS NFR BLD AUTO: 86 %
PLATELET # BLD AUTO: 337 K/UL (ref 150–450)
POTASSIUM SERPL-SCNC: 4.4 MMOL/L (ref 3.5–5.1)
PROT SERPL-MCNC: 6.7 G/DL (ref 6.3–8.2)
SODIUM SERPL-SCNC: 138 MMOL/L (ref 137–145)
WBC # BLD AUTO: 12.6 K/UL (ref 3.8–10.6)

## 2020-01-14 PROCEDURE — 71046 X-RAY EXAM CHEST 2 VIEWS: CPT

## 2020-01-14 PROCEDURE — 99285 EMERGENCY DEPT VISIT HI MDM: CPT

## 2020-01-14 PROCEDURE — 83690 ASSAY OF LIPASE: CPT

## 2020-01-14 PROCEDURE — 36415 COLL VENOUS BLD VENIPUNCTURE: CPT

## 2020-01-14 PROCEDURE — 93005 ELECTROCARDIOGRAM TRACING: CPT

## 2020-01-14 PROCEDURE — 82150 ASSAY OF AMYLASE: CPT

## 2020-01-14 PROCEDURE — 85025 COMPLETE CBC W/AUTO DIFF WBC: CPT

## 2020-01-14 PROCEDURE — 80053 COMPREHEN METABOLIC PANEL: CPT

## 2020-01-14 NOTE — ED
Chest Pain HPI





- General


Chief Complaint: Chest Pain


Stated Complaint: chest pain


Time Seen by Provider: 20 03:12


Source: patient, EMS


Mode of arrival: EMS


Limitations: no limitations





- History of Present Illness


MD Complaint: chest pain


Onset/Timin


-: hour(s)


Onset: other (While smoking)


Pain Location: substernal


Pain Radiation: none


Severity: moderate


Quality: other (Burning)


Consistency: now resolved


Improves With: nothing


Worsens With: palpation


Treatments Prior to Arrival: none





- Related Data


                                Home Medications











 Medication  Instructions  Recorded  Confirmed


 


No Known Home Medications  01/10/19 01/10/19











                                    Allergies











Allergy/AdvReac Type Severity Reaction Status Date / Time


 


No Known Allergies Allergy   Verified 20 03:15














Review of Systems


ROS Statement: 


Those systems with pertinent positive or pertinent negative responses have been 

documented in the HPI.





ROS Other: All systems not noted in ROS Statement are negative.


Constitutional: Denies: fever, chills


Respiratory: Denies: cough, dyspnea


Cardiovascular: Reports: chest pain.  Denies: palpitations, syncope


Gastrointestinal: Denies: abdominal pain, nausea, vomiting


Genitourinary: Denies: dysuria


Musculoskeletal: Denies: back pain


Skin: Denies: rash


Neurological: Denies: headache





EKG Findings





- EKG Results:


EKG: interpreted by GVAINO VALDIVIA, sinus rhythm (Rate 78 bpm), normal axis, normal 

QRS, normal ST/T





- MI, Pacemaker, Normal:


Normal tracing: normal tracing





Past Medical History


Past Medical History: No Reported History


History of Any Multi-Drug Resistant Organisms: None Reported


Past Surgical History:  Section, Orthopedic Surgery


Past Anesthesia/Blood Transfusion Reactions: No Reported Reaction


Additional Past Anesthesia/Blood Transfusion Reaction / Comment(s): no hx blood 

transfusion


Past Psychological History: Anxiety


Smoking Status: Current every day smoker


Past Alcohol Use History: None Reported


Past Drug Use History: Marijuana





- Past Family History


  ** Mother


Family Medical History: No Reported History





  ** Father


Family Medical History: No Reported History





General Exam


Limitations: no limitations


General appearance: alert, in no apparent distress


Head exam: Present: atraumatic, normocephalic


Respiratory exam: Present: normal lung sounds bilaterally, chest wall 

tenderness.  Absent: respiratory distress, wheezes, rales, rhonchi, stridor, 

accessory muscle use, decreased breath sounds, prolonged expiratory


Cardiovascular Exam: Present: regular rate, normal rhythm, normal heart sounds. 

Absent: systolic murmur, diastolic murmur, rubs, gallop


GI/Abdominal exam: Present: soft.  Absent: distended, tenderness, guarding, 

rebound, rigid, mass


Extremities exam: Present: normal inspection, normal capillary refill.  Absent: 

pedal edema, calf tenderness


Back exam: Present: normal inspection.  Absent: CVA tenderness (R), CVA 

tenderness (L)


Neurological exam: Present: alert


Skin exam: Present: warm, dry, intact, normal color.  Absent: rash





Course


                                   Vital Signs











  20





  03:12


 


Temperature 99.4 F


 


Pulse Rate 75


 


Respiratory 16





Rate 


 


Blood Pressure 110/60


 


O2 Sat by Pulse 99





Oximetry 














Disposition


Clinical Impression: 


 Chest pain





Disposition: HOME SELF-CARE


Condition: Good


Instructions (If sedation given, give patient instructions):  Chest Pain (ED)


Is patient prescribed a controlled substance at d/c from ED?: No


Referrals: 


None,Stated [Primary Care Provider] - 1-2 days

## 2020-01-14 NOTE — XR
EXAMINATION TYPE: XR chest 2V

 

DATE OF EXAM: 1/14/2020

 

COMPARISON: 3/9/2018

 

HISTORY: Cough. Chest pain

 

TECHNIQUE:

 

FINDINGS: Heart and mediastinum are normal. Lungs are clear. Diaphragm is normal. Bony thorax appears
 normal.

 

IMPRESSION: Normal chest. No change.

## 2020-05-15 ENCOUNTER — HOSPITAL ENCOUNTER (EMERGENCY)
Dept: HOSPITAL 47 - EC | Age: 25
Discharge: HOME | End: 2020-05-15
Payer: COMMERCIAL

## 2020-05-15 VITALS — RESPIRATION RATE: 16 BRPM | DIASTOLIC BLOOD PRESSURE: 50 MMHG | SYSTOLIC BLOOD PRESSURE: 96 MMHG | HEART RATE: 92 BPM

## 2020-05-15 VITALS — TEMPERATURE: 99.7 F

## 2020-05-15 DIAGNOSIS — F41.0: Primary | ICD-10-CM

## 2020-05-15 DIAGNOSIS — F17.200: ICD-10-CM

## 2020-05-15 LAB
PH UR: 6.5 [PH] (ref 5–8)
SP GR UR: 1 (ref 1–1.03)
UROBILINOGEN UR QL STRIP: <2 MG/DL (ref ?–2)

## 2020-05-15 PROCEDURE — 81003 URINALYSIS AUTO W/O SCOPE: CPT

## 2020-05-15 PROCEDURE — 71046 X-RAY EXAM CHEST 2 VIEWS: CPT

## 2020-05-15 PROCEDURE — 81025 URINE PREGNANCY TEST: CPT

## 2020-05-15 PROCEDURE — 96372 THER/PROPH/DIAG INJ SC/IM: CPT

## 2020-05-15 PROCEDURE — 99284 EMERGENCY DEPT VISIT MOD MDM: CPT

## 2020-05-15 NOTE — XR
EXAMINATION TYPE: XR chest 2V

 

DATE OF EXAM: 5/15/2020

 

COMPARISON: 3/9/2018

 

HISTORY: Cough

 

TECHNIQUE:

 

FINDINGS: Heart and mediastinum are normal. Lungs are clear. Diaphragm is normal. Bony thorax appears
 normal.

 

IMPRESSION: Normal chest. No change.

## 2020-05-20 NOTE — ED
Anxiety HPI





- General


Chief Complaint: Anxiety


Stated Complaint: Anxiety


Time Seen by Provider: 05/15/20 04:26


Source: patient, EMS, RN notes reviewed, old records reviewed


Mode of arrival: EMS





- History of Present Illness


MD Complaint: anxiety, heart racing, shortness of breath


-: hour(s)


Symptoms: dyspnea, palpitations, extremity numbness/tingling, perioral 

numbness/tingling


Place: home


Previous History of Same: Yes


Severity: moderate


Quality: improving


Provoking factors: emotional stress


Improves With: medication (xanax)


Associated symptoms: palpitations, weakness





- Related Data


Home Medications: 


                                Home Medications











 Medication  Instructions  Recorded  Confirmed


 


No Known Home Medications  01/10/19 01/10/19











Allergies/Adverse Reactions: 


                                    Allergies











Allergy/AdvReac Type Severity Reaction Status Date / Time


 


No Known Allergies Allergy   Verified 20 03:15














Review of Systems


ROS Statement: 


Those systems with pertinent positive or pertinent negative responses have been 

documented in the HPI.





ROS Other: All systems not noted in ROS Statement are negative.





Past Medical History


Past Medical History: No Reported History


Additional Past Medical History / Comment(s): anxiety


History of Any Multi-Drug Resistant Organisms: None Reported


Past Surgical History:  Section, Orthopedic Surgery


Past Anesthesia/Blood Transfusion Reactions: No Reported Reaction


Additional Past Anesthesia/Blood Transfusion Reaction / Comment(s): no hx blood 

transfusion


Past Psychological History: Anxiety


Smoking Status: Current every day smoker


Past Alcohol Use History: None Reported


Past Drug Use History: Marijuana





- Past Family History


  ** Mother


Family Medical History: No Reported History





  ** Father


Family Medical History: No Reported History





General Exam


General appearance: alert, in no apparent distress, anxious


Head exam: Present: atraumatic, normocephalic, normal inspection


Eye exam: Present: normal appearance, PERRL, EOMI.  Absent: scleral icterus, 

conjunctival injection, periorbital swelling


ENT exam: Present: normal exam, mucous membranes moist


Neck exam: Present: normal inspection.  Absent: tenderness, meningismus, 

lymphadenopathy


Respiratory exam: Present: normal lung sounds bilaterally.  Absent: respiratory 

distress, wheezes, rales, rhonchi, stridor


Cardiovascular Exam: Present: normal rhythm, tachycardia, normal heart sounds.  

Absent: systolic murmur, diastolic murmur, rubs, gallop, clicks


GI/Abdominal exam: Present: soft, normal bowel sounds.  Absent: distended, 

tenderness, guarding, rebound, rigid


Extremities exam: Present: normal inspection, full ROM, normal capillary refill.

 Absent: tenderness, pedal edema, joint swelling, calf tenderness


Back exam: Present: normal inspection


Neurological exam: Present: alert, oriented X3, CN II-XII intact


Psychiatric exam: Present: normal affect, normal mood


Skin exam: Present: warm, dry, intact, normal color.  Absent: rash





Course


                                   Vital Signs











  05/15/20 05/15/20 05/15/20





  04:26 04:27 04:40


 


Temperature 99.7 F H  


 


Pulse Rate 109 H  


 


Respiratory 18  





Rate   


 


Blood Pressure 115/66 115/66 103/71


 


O2 Sat by Pulse 96 96 





Oximetry   














  05/15/20





  05:00


 


Temperature 


 


Pulse Rate 


 


Respiratory 





Rate 


 


Blood Pressure 112/49


 


O2 Sat by Pulse 96





Oximetry 














Medical Decision Making





- Lab Data


                                   Lab Results











  05/15/20 05/15/20 Range/Units





  04:35 04:35 


 


Urine Color  Colorless   


 


Urine Appearance  Clear   (Clear)  


 


Urine pH  6.5   (5.0-8.0)  


 


Ur Specific Gravity  1.001   (1.001-1.035)  


 


Urine Protein  Negative   (Negative)  


 


Urine Glucose (UA)  Negative   (Negative)  


 


Urine Ketones  Negative   (Negative)  


 


Urine Blood  Negative   (Negative)  


 


Urine Nitrite  Negative   (Negative)  


 


Urine Bilirubin  Negative   (Negative)  


 


Urine Urobilinogen  <2.0   (<2.0)  mg/dL


 


Ur Leukocyte Esterase  Negative   (Negative)  


 


Urine HCG, Qual   Not Detected  (Not Detectd)  














Disposition


Clinical Impression: 


 Acute anxiety, Panic attack





Disposition: HOME SELF-CARE


Condition: Good


Instructions (If sedation given, give patient instructions):  Generalized 

Anxiety Disorder (ED)


Is patient prescribed a controlled substance at d/c from ED?: No


Referrals: 


Nonstaff,Physician [Primary Care Provider] - 1-2 days
lithium, depakote, risperidone

## 2020-07-27 ENCOUNTER — HOSPITAL ENCOUNTER (EMERGENCY)
Dept: HOSPITAL 47 - EC | Age: 25
Discharge: HOME | End: 2020-07-27
Payer: COMMERCIAL

## 2020-07-27 VITALS — SYSTOLIC BLOOD PRESSURE: 99 MMHG | DIASTOLIC BLOOD PRESSURE: 52 MMHG

## 2020-07-27 VITALS — RESPIRATION RATE: 14 BRPM | HEART RATE: 88 BPM | TEMPERATURE: 99.5 F

## 2020-07-27 DIAGNOSIS — F17.200: ICD-10-CM

## 2020-07-27 DIAGNOSIS — J02.9: Primary | ICD-10-CM

## 2020-07-27 LAB
ALBUMIN SERPL-MCNC: 4.1 G/DL (ref 3.5–5)
ALP SERPL-CCNC: 56 U/L (ref 38–126)
ALT SERPL-CCNC: 19 U/L (ref 4–34)
ANION GAP SERPL CALC-SCNC: 6 MMOL/L
AST SERPL-CCNC: 22 U/L (ref 14–36)
BASOPHILS # BLD AUTO: 0 K/UL (ref 0–0.2)
BASOPHILS NFR BLD AUTO: 0 %
BUN SERPL-SCNC: 12 MG/DL (ref 7–17)
CALCIUM SPEC-MCNC: 8.8 MG/DL (ref 8.4–10.2)
CHLORIDE SERPL-SCNC: 109 MMOL/L (ref 98–107)
CO2 SERPL-SCNC: 24 MMOL/L (ref 22–30)
EOSINOPHIL # BLD AUTO: 0.1 K/UL (ref 0–0.7)
EOSINOPHIL NFR BLD AUTO: 2 %
ERYTHROCYTE [DISTWIDTH] IN BLOOD BY AUTOMATED COUNT: 3.96 M/UL (ref 3.8–5.4)
ERYTHROCYTE [DISTWIDTH] IN BLOOD: 13.1 % (ref 11.5–15.5)
GLUCOSE SERPL-MCNC: 90 MG/DL (ref 74–99)
HCT VFR BLD AUTO: 37.2 % (ref 34–46)
HGB BLD-MCNC: 12.6 GM/DL (ref 11.4–16)
LYMPHOCYTES # SPEC AUTO: 0.6 K/UL (ref 1–4.8)
LYMPHOCYTES NFR SPEC AUTO: 12 %
MCH RBC QN AUTO: 31.8 PG (ref 25–35)
MCHC RBC AUTO-ENTMCNC: 33.8 G/DL (ref 31–37)
MCV RBC AUTO: 93.9 FL (ref 80–100)
MONOCYTES # BLD AUTO: 0.4 K/UL (ref 0–1)
MONOCYTES NFR BLD AUTO: 8 %
NEUTROPHILS # BLD AUTO: 3.6 K/UL (ref 1.3–7.7)
NEUTROPHILS NFR BLD AUTO: 77 %
PH UR: 6.5 [PH] (ref 5–8)
PLATELET # BLD AUTO: 246 K/UL (ref 150–450)
POTASSIUM SERPL-SCNC: 4.2 MMOL/L (ref 3.5–5.1)
PROT SERPL-MCNC: 6.8 G/DL (ref 6.3–8.2)
RBC UR QL: 1 /HPF (ref 0–5)
SODIUM SERPL-SCNC: 139 MMOL/L (ref 137–145)
SP GR UR: 1.03 (ref 1–1.03)
UROBILINOGEN UR QL STRIP: <2 MG/DL (ref ?–2)
WBC # BLD AUTO: 4.6 K/UL (ref 3.8–10.6)
WBC #/AREA URNS HPF: <1 /HPF (ref 0–5)

## 2020-07-27 PROCEDURE — 99284 EMERGENCY DEPT VISIT MOD MDM: CPT

## 2020-07-27 PROCEDURE — 87081 CULTURE SCREEN ONLY: CPT

## 2020-07-27 PROCEDURE — 36415 COLL VENOUS BLD VENIPUNCTURE: CPT

## 2020-07-27 PROCEDURE — 87430 STREP A AG IA: CPT

## 2020-07-27 PROCEDURE — 96360 HYDRATION IV INFUSION INIT: CPT

## 2020-07-27 PROCEDURE — 85025 COMPLETE CBC W/AUTO DIFF WBC: CPT

## 2020-07-27 PROCEDURE — 71046 X-RAY EXAM CHEST 2 VIEWS: CPT

## 2020-07-27 PROCEDURE — 80053 COMPREHEN METABOLIC PANEL: CPT

## 2020-07-27 PROCEDURE — 81025 URINE PREGNANCY TEST: CPT

## 2020-07-27 PROCEDURE — 81001 URINALYSIS AUTO W/SCOPE: CPT

## 2020-07-27 NOTE — ED
Fever HPI





- General


Source: patient, EMS


Mode of arrival: EMS


Limitations: no limitations





<Elyssa Joy - Last Filed: 20 07:36>





<Kamila Manriquez - Last Filed: 20 14:33>





- General


Chief Complaint: Fever


Stated Complaint: Fever, Body aches


Time Seen by Provider: 20 06:00





- History of Present Illness


Initial Comments: 


24-year-old male presenting today for chief complaint of fever body aches.  Pat

ient states that she has had fever bodyaches a sore throat and slight cough for 

the past day.  Patient states that it began around 8 PM last night.  She states 

that she took Tylenol bed as well as 1 hour prior to arrival.  Patient denies 

any vomiting flank pain back pain dysuria or urgency frequency she denies any 

chest pain or shortness of breath.  Patient denies any difficulty swallowing.  

Patient denies any abdominal pain. Denies pregnancy. She denies additional 

complaints. Upon arrival patient febrile but appears well nontoxic, no distress.


 (Elyssa Joy)





- Related Data


                                  Previous Rx's











 Medication  Instructions  Recorded


 


Acetaminophen Tab [Tylenol] 650 mg PO Q4H PRN 7 Days #42 tab 20


 


Ibuprofen 600 mg PO Q8H PRN 7 Days #21 tab 20


 


predniSONE 20 mg PO DAILY 4 Days #8 tab 20











                                    Allergies











Allergy/AdvReac Type Severity Reaction Status Date / Time


 


No Known Allergies Allergy   Verified 20 03:15














Review of Systems


ROS Other: All systems not noted in ROS Statement are negative.





<Elyssa Joy - Last Filed: 20 07:36>


ROS Other: All systems not noted in ROS Statement are negative.





<Kamila Manriquez - Last Filed: 20 14:33>


ROS Statement: 


Those systems with pertinent positive or pertinent negative responses have been 

documented in the HPI.








Past Medical History


Past Medical History: No Reported History


Additional Past Medical History / Comment(s): anxiety


History of Any Multi-Drug Resistant Organisms: None Reported


Past Surgical History:  Section, Orthopedic Surgery


Past Anesthesia/Blood Transfusion Reactions: No Reported Reaction


Additional Past Anesthesia/Blood Transfusion Reaction / Comment(s): no hx blood 

transfusion


Past Psychological History: Anxiety


Smoking Status: Current every day smoker


Past Alcohol Use History: Occasional


Past Drug Use History: Marijuana





- Past Family History


  ** Mother


Family Medical History: No Reported History





  ** Father


Family Medical History: No Reported History





<Elyssa Joy - Last Filed: 20 07:36>





General Exam


Limitations: no limitations





<Elyssa Joy - Last Filed: 20 07:36>





- General Exam Comments


Initial Comments: 


General:  The patient is awake and alert, in no distress


Eye:  +3 mm pupils are equal, round and reactive to light, extra-ocular movem

ents are intact.  No nystagmus.  There is normal conjunctiva bilaterally.  No 

signs of icterus.  No photophobia


Ears, nose, mouth and throat:  There are moist mucous membranes and no oral 

lesions.  Oropharynx was mildly erythematous, there is no tonsillar enlargement 

exudates or lesions.  Uvula midline.  Tympanic membranes are not erythematous or

 is no effusions bulging or retraction.  No tenderness to palpation of the 

mastoid.  No anterior cervical lymphadenopathy.  Rhinorrhea, clear and bilateral

 nares.  No tripoding, no drooling.


Neck:  The neck is supple, there is no tenderness or JVD.  No nuchal rigidity 


Cardiovascular:  There is a regular rate and rhythm. No murmur, rub or gallop is

 appreciated.


Respiratory:  Lungs are clear to auscultation, respirations are non-labored, 

breath sounds are equal.  No wheezes, stridor, rales, or rhonchi.  No 

retractions or abdominal breathing.


Musculoskeletal:  Normal ROM, no tenderness.  Strength 5/5. Sensation intact. Ra

dial pulses equal bilaterally 2+.  


Neurological:  A&O x 3. CN II-XII intact grossly, There are no obvious motor or 

sensory deficits. Coordination appears grossly intact. Speech appears normal, no

 muffling.


Skin:  Skin is warm and dry and no rashes or lesions are noted.  No extremity 

edema


Psychiatric:  Cooperative


 (Elyssa Joy)





Course





                                   Vital Signs











  20





  05:22 07:25 07:30


 


Temperature 101.8 F H 99.5 F 


 


Pulse Rate 100 88 


 


Respiratory 17 14 





Rate   


 


Blood Pressure 95/51  99/52


 


O2 Sat by Pulse 96 97 





Oximetry   














Medical Decision Making





- Lab Data


Result diagrams: 


                                 20 06:43





                                 07/27/20 06:43





<Elyssa Joy - Last Filed: 20 07:36>





- Lab Data


Result diagrams: 


                                 20 06:43





                                 20 06:43





<Kamila Manriquez - Last Filed: 20 14:33>





- Medical Decision Making


25yo female presnting for fever. Febrile on arrival. Endorses URI symptoms. 

Present on exam. Labs stable. HR improved with fever control, fluids. Patient 

has no leukocytosis, she does not appear toxic. Patient CXR clear. Lungs clear 

to auscultation. Patient will be discharged with symptomatic treatment/steroids.

 Patient is agreeable to care plan and discharge. discussed case wtih Dr. Manriquez 

who is agreeable to patient discharge/care plan. I discussed importance of f/u, 

return for worsening symptoms and symptomatic treatment with patient she 

verbalized understanding and was discharged appearing well.


 (Elyssa Joy)


I was available for consultation in the emergency department.  The history and 

physical exam were done by the midlevel provider. I was consulted for this 

patients care. I reviewed the case with the midlevel provider and based on 

their presentation of the patient, I agree with the assessment, medical decision

 making and plan of care as documented. 


Chart was dictated using Dragon dictation software.  Attempts were made to 

correct any dictation errors however some typographical errors may persist. 


Patient was seen during a national state of emergency due to the Covid-19 

pandemic. 


 (Kamila Manriquez)





- Lab Data





                                   Lab Results











  20 Range/Units





  06:21 06:30 06:43 


 


WBC     (3.8-10.6)  k/uL


 


RBC     (3.80-5.40)  m/uL


 


Hgb     (11.4-16.0)  gm/dL


 


Hct     (34.0-46.0)  %


 


MCV     (80.0-100.0)  fL


 


MCH     (25.0-35.0)  pg


 


MCHC     (31.0-37.0)  g/dL


 


RDW     (11.5-15.5)  %


 


Plt Count     (150-450)  k/uL


 


Neutrophils %     %


 


Lymphocytes %     %


 


Monocytes %     %


 


Eosinophils %     %


 


Basophils %     %


 


Neutrophils #     (1.3-7.7)  k/uL


 


Lymphocytes #     (1.0-4.8)  k/uL


 


Monocytes #     (0-1.0)  k/uL


 


Eosinophils #     (0-0.7)  k/uL


 


Basophils #     (0-0.2)  k/uL


 


Sodium     (137-145)  mmol/L


 


Potassium     (3.5-5.1)  mmol/L


 


Chloride     ()  mmol/L


 


Carbon Dioxide     (22-30)  mmol/L


 


Anion Gap     mmol/L


 


BUN     (7-17)  mg/dL


 


Creatinine     (0.52-1.04)  mg/dL


 


Est GFR (CKD-EPI)AfAm     (>60 ml/min/1.73 sqM)  


 


Est GFR (CKD-EPI)NonAf     (>60 ml/min/1.73 sqM)  


 


Glucose     (74-99)  mg/dL


 


Calcium     (8.4-10.2)  mg/dL


 


Total Bilirubin     (0.2-1.3)  mg/dL


 


AST     (14-36)  U/L


 


ALT     (4-34)  U/L


 


Alkaline Phosphatase     ()  U/L


 


Total Protein     (6.3-8.2)  g/dL


 


Albumin     (3.5-5.0)  g/dL


 


Urine Color  Yellow    


 


Urine Appearance  Cloudy H    (Clear)  


 


Urine pH  6.5    (5.0-8.0)  


 


Ur Specific Gravity  1.030    (1.001-1.035)  


 


Urine Protein  Trace H    (Negative)  


 


Urine Glucose (UA)  Negative    (Negative)  


 


Urine Ketones  Negative    (Negative)  


 


Urine Blood  Negative    (Negative)  


 


Urine Nitrite  Negative    (Negative)  


 


Urine Bilirubin  Negative    (Negative)  


 


Urine Urobilinogen  <2.0    (<2.0)  mg/dL


 


Ur Leukocyte Esterase  Negative    (Negative)  


 


Urine RBC  1    (0-5)  /hpf


 


Urine WBC  <1    (0-5)  /hpf


 


Calcium Oxalate Crystal  Occasional H    (None)  /hpf


 


Urine Mucus  Occasional H    (None)  /hpf


 


Urine HCG, Qual   Not Detected   (Not Detectd)  


 


Coronavirus (PCR)    Not Detected  (Not Detected)  


 


Group A Strep Rapid     (Negative)  














  20 Range/Units





  06:43 06:43 07:22 


 


WBC  4.6    (3.8-10.6)  k/uL


 


RBC  3.96    (3.80-5.40)  m/uL


 


Hgb  12.6    (11.4-16.0)  gm/dL


 


Hct  37.2    (34.0-46.0)  %


 


MCV  93.9    (80.0-100.0)  fL


 


MCH  31.8    (25.0-35.0)  pg


 


MCHC  33.8    (31.0-37.0)  g/dL


 


RDW  13.1    (11.5-15.5)  %


 


Plt Count  246    (150-450)  k/uL


 


Neutrophils %  77    %


 


Lymphocytes %  12    %


 


Monocytes %  8    %


 


Eosinophils %  2    %


 


Basophils %  0    %


 


Neutrophils #  3.6    (1.3-7.7)  k/uL


 


Lymphocytes #  0.6 L    (1.0-4.8)  k/uL


 


Monocytes #  0.4    (0-1.0)  k/uL


 


Eosinophils #  0.1    (0-0.7)  k/uL


 


Basophils #  0.0    (0-0.2)  k/uL


 


Sodium   139   (137-145)  mmol/L


 


Potassium   4.2   (3.5-5.1)  mmol/L


 


Chloride   109 H   ()  mmol/L


 


Carbon Dioxide   24   (22-30)  mmol/L


 


Anion Gap   6   mmol/L


 


BUN   12   (7-17)  mg/dL


 


Creatinine   0.73   (0.52-1.04)  mg/dL


 


Est GFR (CKD-EPI)AfAm   >90   (>60 ml/min/1.73 sqM)  


 


Est GFR (CKD-EPI)NonAf   >90   (>60 ml/min/1.73 sqM)  


 


Glucose   90   (74-99)  mg/dL


 


Calcium   8.8   (8.4-10.2)  mg/dL


 


Total Bilirubin   0.3   (0.2-1.3)  mg/dL


 


AST   22   (14-36)  U/L


 


ALT   19   (4-34)  U/L


 


Alkaline Phosphatase   56   ()  U/L


 


Total Protein   6.8   (6.3-8.2)  g/dL


 


Albumin   4.1   (3.5-5.0)  g/dL


 


Urine Color     


 


Urine Appearance     (Clear)  


 


Urine pH     (5.0-8.0)  


 


Ur Specific Gravity     (1.001-1.035)  


 


Urine Protein     (Negative)  


 


Urine Glucose (UA)     (Negative)  


 


Urine Ketones     (Negative)  


 


Urine Blood     (Negative)  


 


Urine Nitrite     (Negative)  


 


Urine Bilirubin     (Negative)  


 


Urine Urobilinogen     (<2.0)  mg/dL


 


Ur Leukocyte Esterase     (Negative)  


 


Urine RBC     (0-5)  /hpf


 


Urine WBC     (0-5)  /hpf


 


Calcium Oxalate Crystal     (None)  /hpf


 


Urine Mucus     (None)  /hpf


 


Urine HCG, Qual     (Not Detectd)  


 


Coronavirus (PCR)     (Not Detected)  


 


Group A Strep Rapid    Negative  (Negative)  














Disposition


Is patient prescribed a controlled substance at d/c from ED?: No


Time of Disposition: 07:09





<Elyssa Joy LEONARD - Last Filed: 20 07:36>





<Chris Manriquezah SHAUNA - Last Filed: 20 14:33>


Clinical Impression: 


 Fever, Sore throat, Body aches





Disposition: HOME SELF-CARE


Condition: Good


Instructions (If sedation given, give patient instructions):  Fever in Adults 

(ED)


Additional Instructions: 


Please use medication as discussed.  Please follow-up with family doctor in the 

next 2 days. Can take maximum of 3.5 tablets of tylenol (prescribed kind) or if 

using over the counter you can take up to 2g (4, 500mg tablets daily) Please 

return to emergency room if the symptoms increase or worsen or for any other 

concerns.


Prescriptions: 


Ibuprofen 600 mg PO Q8H PRN 7 Days #21 tab


 PRN Reason: Fever


predniSONE 20 mg PO DAILY 4 Days #8 tab


Acetaminophen Tab [Tylenol] 650 mg PO Q4H PRN 7 Days #42 tab


 PRN Reason: Fever


Referrals: 


Nonstaff,Physician [Primary Care Provider] - 1-2 days

## 2020-07-27 NOTE — XR
EXAMINATION TYPE: XR chest 2V

 

DATE OF EXAM: 7/27/2020

 

COMPARISON: 5/15/2020

 

HISTORY: Cough

 

TECHNIQUE:

 

FINDINGS: Heart and mediastinum are normal. Lungs are clear. Diaphragm is normal. Bony thorax appears
 normal.

 

IMPRESSION: Normal chest. No change.

## 2021-02-22 ENCOUNTER — HOSPITAL ENCOUNTER (EMERGENCY)
Dept: HOSPITAL 47 - EC | Age: 26
Discharge: HOME | End: 2021-02-22
Payer: COMMERCIAL

## 2021-02-22 VITALS
RESPIRATION RATE: 18 BRPM | TEMPERATURE: 98.7 F | HEART RATE: 90 BPM | DIASTOLIC BLOOD PRESSURE: 76 MMHG | SYSTOLIC BLOOD PRESSURE: 144 MMHG

## 2021-02-22 DIAGNOSIS — M79.671: ICD-10-CM

## 2021-02-22 DIAGNOSIS — R10.30: Primary | ICD-10-CM

## 2021-02-22 DIAGNOSIS — F17.200: ICD-10-CM

## 2021-02-22 LAB
ALBUMIN SERPL-MCNC: 4.2 G/DL (ref 3.5–5)
ALP SERPL-CCNC: 62 U/L (ref 38–126)
ALT SERPL-CCNC: 26 U/L (ref 4–34)
AMYLASE SERPL-CCNC: 58 U/L (ref 30–110)
ANION GAP SERPL CALC-SCNC: 11 MMOL/L
AST SERPL-CCNC: 23 U/L (ref 14–36)
BASOPHILS # BLD AUTO: 0 K/UL (ref 0–0.2)
BASOPHILS NFR BLD AUTO: 0 %
BUN SERPL-SCNC: 10 MG/DL (ref 7–17)
CALCIUM SPEC-MCNC: 9.3 MG/DL (ref 8.4–10.2)
CHLORIDE SERPL-SCNC: 102 MMOL/L (ref 98–107)
CO2 SERPL-SCNC: 26 MMOL/L (ref 22–30)
EOSINOPHIL # BLD AUTO: 0.1 K/UL (ref 0–0.7)
EOSINOPHIL NFR BLD AUTO: 2 %
ERYTHROCYTE [DISTWIDTH] IN BLOOD BY AUTOMATED COUNT: 4.26 M/UL (ref 3.8–5.4)
ERYTHROCYTE [DISTWIDTH] IN BLOOD: 12.6 % (ref 11.5–15.5)
GLUCOSE SERPL-MCNC: 115 MG/DL (ref 74–99)
HCT VFR BLD AUTO: 38.6 % (ref 34–46)
HGB BLD-MCNC: 13 GM/DL (ref 11.4–16)
LIPASE SERPL-CCNC: 87 U/L (ref 23–300)
LYMPHOCYTES # SPEC AUTO: 1.9 K/UL (ref 1–4.8)
LYMPHOCYTES NFR SPEC AUTO: 26 %
MCH RBC QN AUTO: 30.4 PG (ref 25–35)
MCHC RBC AUTO-ENTMCNC: 33.6 G/DL (ref 31–37)
MCV RBC AUTO: 90.6 FL (ref 80–100)
MONOCYTES # BLD AUTO: 0.2 K/UL (ref 0–1)
MONOCYTES NFR BLD AUTO: 3 %
NEUTROPHILS # BLD AUTO: 4.8 K/UL (ref 1.3–7.7)
NEUTROPHILS NFR BLD AUTO: 67 %
PH UR: 5.5 [PH] (ref 5–8)
PLATELET # BLD AUTO: 319 K/UL (ref 150–450)
POTASSIUM SERPL-SCNC: 3.8 MMOL/L (ref 3.5–5.1)
PROT SERPL-MCNC: 7.3 G/DL (ref 6.3–8.2)
SODIUM SERPL-SCNC: 139 MMOL/L (ref 137–145)
SP GR UR: 1.02 (ref 1–1.03)
SQUAMOUS UR QL AUTO: 2 /HPF (ref 0–4)
UROBILINOGEN UR QL STRIP: <2 MG/DL (ref ?–2)
WBC # BLD AUTO: 7.1 K/UL (ref 3.8–10.6)
WBC # UR AUTO: 3 /HPF (ref 0–5)

## 2021-02-22 PROCEDURE — 85025 COMPLETE CBC W/AUTO DIFF WBC: CPT

## 2021-02-22 PROCEDURE — 80053 COMPREHEN METABOLIC PANEL: CPT

## 2021-02-22 PROCEDURE — 96361 HYDRATE IV INFUSION ADD-ON: CPT

## 2021-02-22 PROCEDURE — 81001 URINALYSIS AUTO W/SCOPE: CPT

## 2021-02-22 PROCEDURE — 36415 COLL VENOUS BLD VENIPUNCTURE: CPT

## 2021-02-22 PROCEDURE — 83690 ASSAY OF LIPASE: CPT

## 2021-02-22 PROCEDURE — 74177 CT ABD & PELVIS W/CONTRAST: CPT

## 2021-02-22 PROCEDURE — 96375 TX/PRO/DX INJ NEW DRUG ADDON: CPT

## 2021-02-22 PROCEDURE — 99284 EMERGENCY DEPT VISIT MOD MDM: CPT

## 2021-02-22 PROCEDURE — 96374 THER/PROPH/DIAG INJ IV PUSH: CPT

## 2021-02-22 PROCEDURE — 81025 URINE PREGNANCY TEST: CPT

## 2021-02-22 PROCEDURE — 96376 TX/PRO/DX INJ SAME DRUG ADON: CPT

## 2021-02-22 PROCEDURE — 82150 ASSAY OF AMYLASE: CPT

## 2021-02-22 NOTE — CT
EXAMINATION TYPE: CT abdomen pelvis w con

 

DATE OF EXAM: 2/22/2021

 

COMPARISON: None

 

HISTORY: Lower abdominal pain and constipation today.

 

CT DLP: 1540 mGycm

Automated exposure control for dose reduction was used.

 

CONTRAST: 

Performed with IV Contrast, patient injected with 100ml mL of Isovue 300.

 

 

Images obtained from the diaphragm to the floor the pelvis with IV contrast.

 

The lung bases are clear. There is no pleural effusion. Heart size is normal. There is no pericardial
 effusion.

 

Liver spleen stomach pancreas gallbladder appear normal. Bile ducts are not dilated.

 

There is no adrenal mass. Kidneys show satisfactory contrast opacification. There is no hydronephrosi
s.

 

There is no retroperitoneal adenopathy. Delayed images show normal renal excretion. Appendix is poste
rior and appears normal.

 

Bladder distends smoothly. There is no inguinal hernia. Uterus is anteverted. There is no pelvic mass
. There is no free fluid in the pelvis. The lumbar vertebra have normal alignment. Posterior elements
 are intact. There is no compression fracture. Bony pelvis is intact. The hip joints are intact.

 

There is no mesenteric edema. There is no ascites or free air. There is no sign of a bowel obstructio
n.

 

IMPRESSION:

Negative CT scan abdomen and pelvis. No evidence of constipation. Normal appendix.

## 2021-02-22 NOTE — ED
Abdominal Pain HPI





- General


Chief Complaint: Abdominal Pain


Stated Complaint: Right foot pain,ABD pain


Time Seen by Provider: 21 20:58


Source: patient, RN notes reviewed


Mode of arrival: wheelchair


Limitations: no limitations





- History of Present Illness


Initial Comments: 





Patient is a 22 oh female presents to emergency department complaining of lower 

abdominal pain.  She noted pain started this morning.  She'll the pain is about 

8 out of 10 with no relief and unrelenting.  She she stated that she was not 

nauseous or vomited.  She denied any previous surgical history is or ALLERGIES. 

Patient was in no apparent distress or pain while sitting in bed during the 

exam.  He did note that she has not had a bowel movement today and that she 

usually has whenever so they.  She did note that when she got up at the Hospital

she rolled her foot and it became mildly painful.  She denied any chest pain 

first breath headache nausea vomiting diarrhea constipation fever fatigue chills





- Related Data


                                  Previous Rx's











 Medication  Instructions  Recorded


 


Acetaminophen Tab [Tylenol] 650 mg PO Q4H PRN 7 Days #42 tab 20


 


Ibuprofen 600 mg PO Q8H PRN 7 Days #21 tab 20


 


predniSONE 20 mg PO DAILY 4 Days #8 tab 20











                                    Allergies











Allergy/AdvReac Type Severity Reaction Status Date / Time


 


No Known Allergies Allergy   Verified 20 03:15














Review of Systems


ROS Statement: 


Those systems with pertinent positive or pertinent negative responses have been 

documented in the HPI.





ROS Other: All systems not noted in ROS Statement are negative.





Past Medical History


Past Medical History: No Reported History


Additional Past Medical History / Comment(s): anxiety


History of Any Multi-Drug Resistant Organisms: None Reported


Past Surgical History:  Section, Orthopedic Surgery


Past Anesthesia/Blood Transfusion Reactions: No Reported Reaction


Additional Past Anesthesia/Blood Transfusion Reaction / Comment(s): no hx blood 

transfusion


Past Psychological History: Anxiety


Smoking Status: Current every day smoker


Past Alcohol Use History: Occasional


Past Drug Use History: Marijuana





- Past Family History


  ** Mother


Family Medical History: No Reported History





  ** Father


Family Medical History: No Reported History





General Exam


Limitations: no limitations


General appearance: alert, in no apparent distress, obese


Head exam: Present: atraumatic, normocephalic, normal inspection


Eye exam: Present: normal appearance, PERRL, EOMI.  Absent: scleral icterus, 

conjunctival injection, periorbital swelling


ENT exam: Present: mucous membranes moist


Neck exam: Present: normal inspection.  Absent: tenderness, meningismus, 

lymphadenopathy


Respiratory exam: Present: normal lung sounds bilaterally.  Absent: respiratory 

distress, wheezes, rales, rhonchi, stridor


Cardiovascular Exam: Present: regular rate, normal rhythm, normal heart sounds. 

 Absent: systolic murmur, diastolic murmur, rubs, gallop, clicks


GI/Abdominal exam: Present: soft, tenderness (Generalized lower abdominal), 

hypoactive bowel sounds.  Absent: distended, guarding, rebound, rigid


Extremities exam: Present: normal inspection, full ROM, normal capillary refill.

  Absent: tenderness, pedal edema, joint swelling, calf tenderness


Neurological exam: Present: alert, oriented X3, CN II-XII intact


Psychiatric exam: Present: normal affect, normal mood


Skin exam: Present: warm, dry, intact, normal color.  Absent: rash





Course


                                   Vital Signs











  21





  20:50


 


Temperature 98.7 F


 


Pulse Rate 90


 


Respiratory 18





Rate 


 


Blood Pressure 144/76


 


O2 Sat by Pulse 99





Oximetry 














Medical Decision Making





- Medical Decision Making





25-year-old female complaining of lower abdominal pain.


Labwork, 4 morphine, 4 mg of Zofran, 1 L normal saline bolus, CT of the abdomen 

and pelvis ordered.


Labs unremarkable.


CT of abdomen and pelvis: Negative for any acute process.


Case discussed with Dr. Valdivia, etc. the patient to discharge home.





- Lab Data


Result diagrams: 


                                 21 21:17





                                 21 21:17


                                   Lab Results











  21 Range/Units





  21:17 21:17 21:25 


 


WBC  7.1    (3.8-10.6)  k/uL


 


RBC  4.26    (3.80-5.40)  m/uL


 


Hgb  13.0    (11.4-16.0)  gm/dL


 


Hct  38.6    (34.0-46.0)  %


 


MCV  90.6    (80.0-100.0)  fL


 


MCH  30.4    (25.0-35.0)  pg


 


MCHC  33.6    (31.0-37.0)  g/dL


 


RDW  12.6    (11.5-15.5)  %


 


Plt Count  319    (150-450)  k/uL


 


MPV  6.6    


 


Neutrophils %  67    %


 


Lymphocytes %  26    %


 


Monocytes %  3    %


 


Eosinophils %  2    %


 


Basophils %  0    %


 


Neutrophils #  4.8    (1.3-7.7)  k/uL


 


Lymphocytes #  1.9    (1.0-4.8)  k/uL


 


Monocytes #  0.2    (0-1.0)  k/uL


 


Eosinophils #  0.1    (0-0.7)  k/uL


 


Basophils #  0.0    (0-0.2)  k/uL


 


Sodium   139   (137-145)  mmol/L


 


Potassium   3.8   (3.5-5.1)  mmol/L


 


Chloride   102   ()  mmol/L


 


Carbon Dioxide   26   (22-30)  mmol/L


 


Anion Gap   11   mmol/L


 


BUN   10   (7-17)  mg/dL


 


Creatinine   0.74   (0.52-1.04)  mg/dL


 


Est GFR (CKD-EPI)AfAm   >90   (>60 ml/min/1.73 sqM)  


 


Est GFR (CKD-EPI)NonAf   >90   (>60 ml/min/1.73 sqM)  


 


Glucose   115 H   (74-99)  mg/dL


 


Calcium   9.3   (8.4-10.2)  mg/dL


 


Total Bilirubin   0.3   (0.2-1.3)  mg/dL


 


AST   23   (14-36)  U/L


 


ALT   26   (4-34)  U/L


 


Alkaline Phosphatase   62   ()  U/L


 


Total Protein   7.3   (6.3-8.2)  g/dL


 


Albumin   4.2   (3.5-5.0)  g/dL


 


Amylase   58   ()  U/L


 


Lipase   87   ()  U/L


 


Urine Color    Yellow  


 


Urine Appearance    Clear  (Clear)  


 


Urine pH    5.5  (5.0-8.0)  


 


Ur Specific Gravity    1.023  (1.001-1.035)  


 


Urine Protein    Negative  (Negative)  


 


Urine Glucose (UA)    Negative  (Negative)  


 


Urine Ketones    Negative  (Negative)  


 


Urine Blood    Negative  (Negative)  


 


Urine Nitrite    Negative  (Negative)  


 


Urine Bilirubin    Negative  (Negative)  


 


Urine Urobilinogen    <2.0  (<2.0)  mg/dL


 


Ur Leukocyte Esterase    Trace H  (Negative)  


 


Urine WBC    3  (0-5)  /hpf


 


Ur Squamous Epith Cells    2  (0-4)  /hpf


 


Urine Mucus    Occasional H  (None)  /hpf


 


Urine HCG, Qual     (Not Detectd)  














  02/22/21 Range/Units





  21:25 


 


WBC   (3.8-10.6)  k/uL


 


RBC   (3.80-5.40)  m/uL


 


Hgb   (11.4-16.0)  gm/dL


 


Hct   (34.0-46.0)  %


 


MCV   (80.0-100.0)  fL


 


MCH   (25.0-35.0)  pg


 


MCHC   (31.0-37.0)  g/dL


 


RDW   (11.5-15.5)  %


 


Plt Count   (150-450)  k/uL


 


MPV   


 


Neutrophils %   %


 


Lymphocytes %   %


 


Monocytes %   %


 


Eosinophils %   %


 


Basophils %   %


 


Neutrophils #   (1.3-7.7)  k/uL


 


Lymphocytes #   (1.0-4.8)  k/uL


 


Monocytes #   (0-1.0)  k/uL


 


Eosinophils #   (0-0.7)  k/uL


 


Basophils #   (0-0.2)  k/uL


 


Sodium   (137-145)  mmol/L


 


Potassium   (3.5-5.1)  mmol/L


 


Chloride   ()  mmol/L


 


Carbon Dioxide   (22-30)  mmol/L


 


Anion Gap   mmol/L


 


BUN   (7-17)  mg/dL


 


Creatinine   (0.52-1.04)  mg/dL


 


Est GFR (CKD-EPI)AfAm   (>60 ml/min/1.73 sqM)  


 


Est GFR (CKD-EPI)NonAf   (>60 ml/min/1.73 sqM)  


 


Glucose   (74-99)  mg/dL


 


Calcium   (8.4-10.2)  mg/dL


 


Total Bilirubin   (0.2-1.3)  mg/dL


 


AST   (14-36)  U/L


 


ALT   (4-34)  U/L


 


Alkaline Phosphatase   ()  U/L


 


Total Protein   (6.3-8.2)  g/dL


 


Albumin   (3.5-5.0)  g/dL


 


Amylase   ()  U/L


 


Lipase   ()  U/L


 


Urine Color   


 


Urine Appearance   (Clear)  


 


Urine pH   (5.0-8.0)  


 


Ur Specific Gravity   (1.001-1.035)  


 


Urine Protein   (Negative)  


 


Urine Glucose (UA)   (Negative)  


 


Urine Ketones   (Negative)  


 


Urine Blood   (Negative)  


 


Urine Nitrite   (Negative)  


 


Urine Bilirubin   (Negative)  


 


Urine Urobilinogen   (<2.0)  mg/dL


 


Ur Leukocyte Esterase   (Negative)  


 


Urine WBC   (0-5)  /hpf


 


Ur Squamous Epith Cells   (0-4)  /hpf


 


Urine Mucus   (None)  /hpf


 


Urine HCG, Qual  Not Detected  (Not Detectd)  














- Radiology Data


Radiology results: report reviewed, image reviewed


Negative computed tomography scan of abdomen and pelvis.  No evidence 

constipation normal appendix.





Disposition


Clinical Impression: 


 Abdominal pain





Disposition: HOME SELF-CARE


Condition: Stable


Instructions (If sedation given, give patient instructions):  Abdominal Pain 

(ED)


Additional Instructions: 


Please return to the Emergency Department if symptoms worsen or any other 

concerns.


Follow-up with primary care 1-2 days.


Take over-the-counter pain medication as needed for management.


Increase oral intake of fluids.


Is patient prescribed a controlled substance at d/c from ED?: No


Referrals: 


None,Stated [Primary Care Provider] - 1-2 days


Time of Disposition: 22:43

## 2021-03-26 ENCOUNTER — HOSPITAL ENCOUNTER (EMERGENCY)
Dept: HOSPITAL 47 - EC | Age: 26
LOS: 1 days | Discharge: HOME | End: 2021-03-27
Payer: COMMERCIAL

## 2021-03-26 VITALS — RESPIRATION RATE: 18 BRPM

## 2021-03-26 DIAGNOSIS — U07.1: Primary | ICD-10-CM

## 2021-03-26 PROCEDURE — 71045 X-RAY EXAM CHEST 1 VIEW: CPT

## 2021-03-26 PROCEDURE — 87635 SARS-COV-2 COVID-19 AMP PRB: CPT

## 2021-03-26 PROCEDURE — 99284 EMERGENCY DEPT VISIT MOD MDM: CPT

## 2021-03-26 PROCEDURE — 87081 CULTURE SCREEN ONLY: CPT

## 2021-03-26 PROCEDURE — 87430 STREP A AG IA: CPT

## 2021-03-26 NOTE — XR
EXAMINATION TYPE: XR chest 1V portable

 

DATE OF EXAM: 3/26/2021

 

COMPARISON: 7/27/2020

 

HISTORY: Cough

 

TECHNIQUE: Single view

 

FINDINGS: Heart and mediastinum are normal. Lungs are clear. Diaphragm is normal. Bony thorax appears
 intact. Pulmonary vascularity is normal.

 

IMPRESSION: Normal chest. No change.

## 2021-03-27 VITALS — DIASTOLIC BLOOD PRESSURE: 65 MMHG | HEART RATE: 84 BPM | TEMPERATURE: 98.7 F | SYSTOLIC BLOOD PRESSURE: 119 MMHG

## 2021-03-27 NOTE — ED
General Adult HPI





- General


Chief complaint: Upper Respiratory Infection


Stated complaint: Sore throat, body aches


Time Seen by Provider: 21 23:04


Source: patient, RN notes reviewed


Mode of arrival: ambulatory


Limitations: no limitations





- History of Present Illness


Initial comments: 





25-year-old female with a past medical history of anxiety presents to the 

emergency room for not feeling well.  Patient reports that since yesterday she 

has had a cough and sore throat.  States she has chills and body aches.  She has

not had any fevers that she is aware of.  Patient did take Motrin about 4 hours 

prior to arrival as well as 500 mg of Tylenol.  Patient states that today she 

vomited.  Patient denies any covid exposures.  Patient denies shortness of 

breath.  Denies history of asthma.  Patient has no other complaints at this time

including shortness of breath, chest pain, abdominal pain, nausea or vomiting, 

headache, or visual changes.





- Related Data


                                  Previous Rx's











 Medication  Instructions  Recorded


 


Acetaminophen Tab [Tylenol] 650 mg PO Q4H PRN 7 Days #42 tab 20


 


Ibuprofen 600 mg PO Q8H PRN 7 Days #21 tab 20


 


predniSONE 20 mg PO DAILY 4 Days #8 tab 20











                                    Allergies











Allergy/AdvReac Type Severity Reaction Status Date / Time


 


No Known Allergies Allergy   Verified 21 22:58














Review of Systems


ROS Statement: 


Those systems with pertinent positive or pertinent negative responses have been 

documented in the HPI.





ROS Other: All systems not noted in ROS Statement are negative.





Past Medical History


Past Medical History: No Reported History


Additional Past Medical History / Comment(s): anxiety


History of Any Multi-Drug Resistant Organisms: None Reported


Past Surgical History:  Section, Orthopedic Surgery


Past Anesthesia/Blood Transfusion Reactions: No Reported Reaction


Additional Past Anesthesia/Blood Transfusion Reaction / Comment(s): no hx blood 

transfusion


Past Psychological History: Anxiety


Smoking Status: Vaper


Past Alcohol Use History: Occasional


Past Drug Use History: Marijuana





- Past Family History


  ** Mother


Family Medical History: No Reported History





  ** Father


Family Medical History: No Reported History





General Exam


Limitations: no limitations


General appearance: alert, in no apparent distress


Head exam: Present: atraumatic, normocephalic, normal inspection


Eye exam: Present: normal appearance, PERRL, EOMI.  Absent: scleral icterus, 

conjunctival injection, periorbital swelling


ENT exam: Present: normal exam, normal oropharynx (Uvula midline, no tonsillar 

exudates bilaterally), mucous membranes moist, TM's normal bilaterally, normal 

external ear exam


Neck exam: Present: normal inspection, full ROM.  Absent: tenderness, meningis

mus, lymphadenopathy


Respiratory exam: Present: normal lung sounds bilaterally.  Absent: respiratory 

distress, wheezes, rales, rhonchi, stridor


Cardiovascular Exam: Present: regular rate, normal rhythm, normal heart sounds. 

 Absent: systolic murmur, diastolic murmur, rubs, gallop, clicks


GI/Abdominal exam: Present: soft, normal bowel sounds.  Absent: distended, 

tenderness, guarding, rebound, rigid





Course


                                   Vital Signs











  21





  22:56 23:13 01:06


 


Temperature 99.4 F 100.7 F H 98.7 F


 


Pulse Rate 86  84


 


Respiratory 18  18





Rate   


 


Blood Pressure 144/77  119/65


 


O2 Sat by Pulse 100  99





Oximetry   














Medical Decision Making





- Medical Decision Making





Vitals are stable.  Patient is well-appearing.  She does have a fever of 100.7. 

 100% on room air.  No S3 distress.  Corona virus was detected.  Strep negative.

  Chest x-ray showed no acute process.  I did recommend patient have antibody 

infusion as she does meet the criteria with BMI of 39.5.  I did discuss the 

benefits of this medication however patient refuses.  She does not for 

comfortable with this medication given the emergency authorization status after 

reviewing the information packet.  At this time discussed quarantining and takin

g vitamins.  Discussed returning for any worsening symptoms such as SOB.





- Lab Data


                                   Lab Results











  21 Range/Units





  23:24 23:24 


 


Coronavirus (PCR)   Detected A  (Not Detectd)  


 


Group A Strep Rapid  Negative   (Negative)  














Disposition


Clinical Impression: 


 COVID-19





Disposition: HOME SELF-CARE


Condition: Good


Instructions (If sedation given, give patient instructions):  Coronavirus 

Disease 2019 (COVID-19)


Additional Instructions: 


Please make sure to quarantine for at least 10 -14 days.  Take Motrin and 

Tylenol for fever.  Follow-up with your doctor.  If you develop worsening 

symptoms such as shortness of breath return to the emergency room.


Is patient prescribed a controlled substance at d/c from ED?: No


Referrals: 


Nonstaff,Physician [Primary Care Provider] - 1-2 days


Time of Disposition: 00:43

## 2021-04-23 ENCOUNTER — HOSPITAL ENCOUNTER (EMERGENCY)
Dept: HOSPITAL 47 - EC | Age: 26
Discharge: HOME | End: 2021-04-23
Payer: COMMERCIAL

## 2021-04-23 VITALS
SYSTOLIC BLOOD PRESSURE: 115 MMHG | HEART RATE: 95 BPM | TEMPERATURE: 98.2 F | RESPIRATION RATE: 20 BRPM | DIASTOLIC BLOOD PRESSURE: 65 MMHG

## 2021-04-23 DIAGNOSIS — S81.851A: Primary | ICD-10-CM

## 2021-04-23 DIAGNOSIS — L03.115: ICD-10-CM
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
## 2021-04-23 PROCEDURE — 99283 EMERGENCY DEPT VISIT LOW MDM: CPT

## 2021-04-23 NOTE — ED
Extremity Problem HPI





- General


Chief complaint: Extremity Problem,Nontraumatic


Stated complaint: R Leg Lump


Time Seen by Provider: 21 21:29


Source: patient, RN notes reviewed, old records reviewed


Mode of arrival: ambulatory


Limitations: no limitations





- History of Present Illness


Initial comments: 





Is a 25-year-old female DF she presents today for evaluation regards to bug bite

of right lower extremity.  Patient's bug bite to her right calf with increasing 

swelling.  Otherwise no complaints no other she is no injuries.  No fevers.  

Patient has no significant medical history takes no medications


MD Complaint: extremity pain, extremity swelling, other (Bug bite right calf)


-: days(s) (2)


Location: right, lower extremity


History of Same: No


Radiation: none


Severity scale (1-10): 5


Quality: stabbing


Consistency: constant


Improves with: nothing


Worsens with: nothing


Associated Symptoms: denies other symptoms





- Related Data


                                  Previous Rx's











 Medication  Instructions  Recorded


 


Acetaminophen Tab [Tylenol] 650 mg PO Q4H PRN 7 Days #42 tab 20


 


Ibuprofen 600 mg PO Q8H PRN 7 Days #21 tab 20


 


predniSONE 20 mg PO DAILY 4 Days #8 tab 20











                                    Allergies











Allergy/AdvReac Type Severity Reaction Status Date / Time


 


No Known Allergies Allergy   Verified 21 21:22














Review of Systems


ROS Statement: 


Those systems with pertinent positive or pertinent negative responses have been 

documented in the HPI.





ROS Other: All systems not noted in ROS Statement are negative.





Past Medical History


Past Medical History: No Reported History


Additional Past Medical History / Comment(s): anxiety


History of Any Multi-Drug Resistant Organisms: None Reported


Past Surgical History:  Section, Orthopedic Surgery


Past Anesthesia/Blood Transfusion Reactions: No Reported Reaction


Additional Past Anesthesia/Blood Transfusion Reaction / Comment(s): no hx blood 

transfusion


Past Psychological History: Anxiety


Smoking Status: Vaper


Past Alcohol Use History: Occasional


Past Drug Use History: Marijuana





- Past Family History


  ** Mother


Family Medical History: No Reported History





  ** Father


Family Medical History: No Reported History





General Exam





- General Exam Comments


Initial Comments: 





Patient does have mild bug bite to right calf surrounding swelling and erythema


Limitations: no limitations


General appearance: alert, in no apparent distress


Head exam: Present: atraumatic, normocephalic, normal inspection


Eye exam: Present: normal appearance, PERRL, EOMI.  Absent: scleral icterus, 

conjunctival injection, periorbital swelling


ENT exam: Present: normal exam, mucous membranes moist


Neck exam: Present: normal inspection.  Absent: tenderness, meningismus, 

lymphadenopathy


Respiratory exam: Present: normal lung sounds bilaterally.  Absent: respiratory 

distress, wheezes, rales, rhonchi, stridor


Cardiovascular Exam: Present: regular rate, normal rhythm, normal heart sounds. 

 Absent: systolic murmur, diastolic murmur, rubs, gallop, clicks


GI/Abdominal exam: Present: soft, normal bowel sounds.  Absent: distended, 

tenderness, guarding, rebound, rigid


Extremities exam: Present: normal inspection, full ROM, normal capillary refill.

  Absent: tenderness, pedal edema, joint swelling, calf tenderness


Back exam: Present: normal inspection


Neurological exam: Present: alert, oriented X3, CN II-XII intact


Psychiatric exam: Present: normal affect, normal mood


Skin exam: Present: warm, dry, intact, normal color.  Absent: rash





Course


                                   Vital Signs











  21





  21:19


 


Temperature 98.2 F


 


Pulse Rate 95


 


Respiratory 20





Rate 


 


Blood Pressure 115/65


 


O2 Sat by Pulse 99





Oximetry 














- Reevaluation(s)


Reevaluation #1: 





21 22:58


Medical record is reviewed


Reevaluation #2: 





21 22:58


Patient is informed of results and questions answered





Medical Decision Making





- Medical Decision Making





25 female DF for evaluation, patient replacement antibiotics for infected bug 

bite.  No abscesses noted to be drainable.  Patient can be discharged home





- Radiology Data


Radiology results: report reviewed (Ultrasound bug bite does not show drainable 

abscess), image reviewed





Disposition


Clinical Impression: 


 Open bite, right lower leg, initial encounter, Cellulitis of right leg





Disposition: HOME SELF-CARE


Condition: Good


Instructions (If sedation given, give patient instructions):  Insect Bite or 

Sting (ED), Cellulitis (ED)


Is patient prescribed a controlled substance at d/c from ED?: No


Referrals: 


None,Stated [Primary Care Provider] - 1-2 days

## 2021-04-23 NOTE — US
EXAMINATION TYPE: US extremity nonvasc mass RT

 

DATE OF EXAM: 4/23/2021

 

COMPARISON: NONE

 

CLINICAL HISTORY: dvt. EC patient with red raised area with central ulcer/scab located medial upper r
ight calf and noted x 2 days. Patient stated had Tick in abdomen yesterday which she removed.

 

US findings of medial upper right calf at skin redness:  at central ulcer an irregular hypoechoic are
a is seen and size measuring 3 x 3 x 6 mm. No drainable fluid collections. No significant adjacent ed
ema or hyperemia. Medial Saphenous Vein is patent. 

 

 

 

 

 

IMPRESSION:  At the medial upper calf in region of ulceration, there is a 3 x 3 x 6 mm hypoechoic reg
ion which may represent puncture wound or small focal edema. No drainable abscess.

## 2021-06-04 ENCOUNTER — HOSPITAL ENCOUNTER (EMERGENCY)
Dept: HOSPITAL 47 - EC | Age: 26
Discharge: HOME | End: 2021-06-04
Payer: COMMERCIAL

## 2021-06-04 VITALS — RESPIRATION RATE: 20 BRPM

## 2021-06-04 VITALS — TEMPERATURE: 98.6 F | HEART RATE: 71 BPM | SYSTOLIC BLOOD PRESSURE: 106 MMHG | DIASTOLIC BLOOD PRESSURE: 64 MMHG

## 2021-06-04 DIAGNOSIS — F12.90: ICD-10-CM

## 2021-06-04 DIAGNOSIS — R53.83: ICD-10-CM

## 2021-06-04 DIAGNOSIS — R10.30: Primary | ICD-10-CM

## 2021-06-04 LAB
ALBUMIN SERPL-MCNC: 4.2 G/DL (ref 3.5–5)
ALP SERPL-CCNC: 67 U/L (ref 38–126)
ALT SERPL-CCNC: 23 U/L (ref 4–34)
ANION GAP SERPL CALC-SCNC: 6 MMOL/L
AST SERPL-CCNC: 25 U/L (ref 14–36)
BASOPHILS # BLD AUTO: 0 K/UL (ref 0–0.2)
BASOPHILS NFR BLD AUTO: 0 %
BUN SERPL-SCNC: 8 MG/DL (ref 7–17)
CALCIUM SPEC-MCNC: 9.4 MG/DL (ref 8.4–10.2)
CHLORIDE SERPL-SCNC: 104 MMOL/L (ref 98–107)
CO2 SERPL-SCNC: 29 MMOL/L (ref 22–30)
EOSINOPHIL # BLD AUTO: 0.1 K/UL (ref 0–0.7)
EOSINOPHIL NFR BLD AUTO: 2 %
ERYTHROCYTE [DISTWIDTH] IN BLOOD BY AUTOMATED COUNT: 4.16 M/UL (ref 3.8–5.4)
ERYTHROCYTE [DISTWIDTH] IN BLOOD: 12.7 % (ref 11.5–15.5)
GLUCOSE SERPL-MCNC: 86 MG/DL (ref 74–99)
HCT VFR BLD AUTO: 37.3 % (ref 34–46)
HGB BLD-MCNC: 12.7 GM/DL (ref 11.4–16)
LYMPHOCYTES # SPEC AUTO: 1.9 K/UL (ref 1–4.8)
LYMPHOCYTES NFR SPEC AUTO: 28 %
MCH RBC QN AUTO: 30.6 PG (ref 25–35)
MCHC RBC AUTO-ENTMCNC: 34.1 G/DL (ref 31–37)
MCV RBC AUTO: 89.6 FL (ref 80–100)
MONOCYTES # BLD AUTO: 0.4 K/UL (ref 0–1)
MONOCYTES NFR BLD AUTO: 6 %
NEUTROPHILS # BLD AUTO: 4.2 K/UL (ref 1.3–7.7)
NEUTROPHILS NFR BLD AUTO: 63 %
PH UR: 6.5 [PH] (ref 5–8)
PLATELET # BLD AUTO: 347 K/UL (ref 150–450)
POTASSIUM SERPL-SCNC: 3.9 MMOL/L (ref 3.5–5.1)
PROT SERPL-MCNC: 7.1 G/DL (ref 6.3–8.2)
SODIUM SERPL-SCNC: 139 MMOL/L (ref 137–145)
SP GR UR: 1.02 (ref 1–1.03)
UROBILINOGEN UR QL STRIP: <2 MG/DL (ref ?–2)
WBC # BLD AUTO: 6.6 K/UL (ref 3.8–10.6)

## 2021-06-04 PROCEDURE — 81003 URINALYSIS AUTO W/O SCOPE: CPT

## 2021-06-04 PROCEDURE — 96375 TX/PRO/DX INJ NEW DRUG ADDON: CPT

## 2021-06-04 PROCEDURE — 85025 COMPLETE CBC W/AUTO DIFF WBC: CPT

## 2021-06-04 PROCEDURE — 81025 URINE PREGNANCY TEST: CPT

## 2021-06-04 PROCEDURE — 96361 HYDRATE IV INFUSION ADD-ON: CPT

## 2021-06-04 PROCEDURE — 96374 THER/PROPH/DIAG INJ IV PUSH: CPT

## 2021-06-04 PROCEDURE — 36415 COLL VENOUS BLD VENIPUNCTURE: CPT

## 2021-06-04 PROCEDURE — 80053 COMPREHEN METABOLIC PANEL: CPT

## 2021-06-04 PROCEDURE — 99284 EMERGENCY DEPT VISIT MOD MDM: CPT

## 2021-06-04 NOTE — ED
Abdominal Pain HPI





- General


Chief Complaint: Abdominal Pain


Stated Complaint: UTI, back & side pain


Time Seen by Provider: 21 15:35


Source: patient


Mode of arrival: ambulatory


Limitations: no limitations





- History of Present Illness


Initial Comments: 





Patient is a 25-year-old female presenting to the emergency department with 

concerns of a possible UTI.  Patient states she went to urgent care last week, 

was diagnosed with a UTI, and completed a 7 day course of Bactrim.  Patient 

states that ended on Monday.  She continues to feel nauseous, fatigued.  She is 

still having a little bit of lower abdominal discomfort and is afraid that the 

UTI is still there.  She is also complaining of a little bit of lower back 

discomfort.  She denies any fevers or chills, no vomiting or diarrhea.  She 

denies being pregnant.  She denies any vaginal discharge, no concerns for STDs. 

She has no further complaints of this time.





- Related Data


                                  Previous Rx's











 Medication  Instructions  Recorded


 


Acetaminophen Tab [Tylenol] 650 mg PO Q4H PRN 7 Days #42 tab 20


 


Ibuprofen 600 mg PO Q8H PRN 7 Days #21 tab 20


 


predniSONE 20 mg PO DAILY 4 Days #8 tab 20


 


Cephalexin [Keflex] 500 mg PO Q8HR 7 Days #21 cap 21


 


Sulfamethox-Tmp 800-160Mg [Bactrim 2 tab PO BID #28 tab 21





-160 mg]  











                                    Allergies











Allergy/AdvReac Type Severity Reaction Status Date / Time


 


No Known Allergies Allergy   Verified 21 15:32














Review of Systems


ROS Statement: 


Those systems with pertinent positive or pertinent negative responses have been 

documented in the HPI.





ROS Other: All systems not noted in ROS Statement are negative.





Past Medical History


Past Medical History: No Reported History


Additional Past Medical History / Comment(s): anxiety


History of Any Multi-Drug Resistant Organisms: None Reported


Past Surgical History:  Section, Orthopedic Surgery


Past Anesthesia/Blood Transfusion Reactions: No Reported Reaction


Additional Past Anesthesia/Blood Transfusion Reaction / Comment(s): no hx blood 

transfusion


Past Psychological History: Anxiety


Smoking Status: Vaper


Past Alcohol Use History: Occasional


Past Drug Use History: Marijuana





- Past Family History


  ** Mother


Family Medical History: No Reported History





  ** Father


Family Medical History: No Reported History





General Exam





- General Exam Comments


Initial Comments: 





GENERAL: 


Patient is well-developed and well-nourished.  Patient is nontoxic and in no 

acute distress.





HEAD: 


Atraumatic, normocephalic.





EYES:


Pupils equal round and reactive to light, extraocular movements intact, sclera 

anicteric, conjunctiva are normal.  Eyelids were unremarkable.





ENT: 


TMs normal, nares patent, oropharynx clear without exudates.  Moist mucous 

membranes.





NECK: 


Normal range of motion, supple without lymphadenopathy or JVD.





LUNGS:


Unlabored respirations.  Breath sounds clear to auscultation bilaterally and 

equal.  No wheezes rales or rhonchi.





HEART:


Regular rate and rhythm without murmurs, rubs or gallops.





ABDOMEN: 


Soft, mild suprapubic discomfort on palpation, no other areas of pain, 

normoactive bowel sounds.  No guarding, no rebound.  No masses appreciated.





: Deferred 





MUSCULOSKELETAL: 


Normal extremities with adequate strength and normal range of motion, no pitting

or edema.  No clubbing or cyanosis.





NEUROLOGICAL: 


Patient is alert and oriented x 3.  Motor and sensory are also intact.  Cranial 

nerves II through XII grossly intact.  Symmetrical smile.  Normal speech, normal

gait.   





PSYCH:


Normal mood, normal affect.





SKIN:


 Warm, Dry, normal turgor, no rashes or lesions noted.


Limitations: no limitations





Course





                                   Vital Signs











  21





  15:28


 


Temperature 97.8 F


 


Pulse Rate 82


 


Respiratory 20





Rate 


 


Blood Pressure 110/64


 


O2 Sat by Pulse 97





Oximetry 














Medical Decision Making





- Medical Decision Making





Patient is a 25-year-old female presenting for concerns of a UTI.  She is having

some continued nausea, fatigue, lower back discomfort.  Her vitals are stable, 

afebrile.  Her labs are unremarkable, normal white count.  Urine is also 

unremarkable.  I will also send this for culture.  I discussed these findings 

with the patient.  Patient received a liter of fluids, Toradol and Zofran.  She 

does report improvement in her symptoms.  I discussed the patient's symptoms 

could be related to the antibiotic that she was on some mild dehydration.  I 

recommended following up with her PCP.  She is in agreement with this plan of 

care and she is stable for discharge.  Return parameters were discussed with her

and she verbalized understanding.  Case discussed with Dr. Valdivia.





- Lab Data


Result diagrams: 


                                 21 16:01





                                 21 16:01





                                   Lab Results











  21 Range/Units





  16:01 16:01 16:01 


 


WBC  6.6    (3.8-10.6)  k/uL


 


RBC  4.16    (3.80-5.40)  m/uL


 


Hgb  12.7    (11.4-16.0)  gm/dL


 


Hct  37.3    (34.0-46.0)  %


 


MCV  89.6    (80.0-100.0)  fL


 


MCH  30.6    (25.0-35.0)  pg


 


MCHC  34.1    (31.0-37.0)  g/dL


 


RDW  12.7    (11.5-15.5)  %


 


Plt Count  347    (150-450)  k/uL


 


MPV  6.5    


 


Neutrophils %  63    %


 


Lymphocytes %  28    %


 


Monocytes %  6    %


 


Eosinophils %  2    %


 


Basophils %  0    %


 


Neutrophils #  4.2    (1.3-7.7)  k/uL


 


Lymphocytes #  1.9    (1.0-4.8)  k/uL


 


Monocytes #  0.4    (0-1.0)  k/uL


 


Eosinophils #  0.1    (0-0.7)  k/uL


 


Basophils #  0.0    (0-0.2)  k/uL


 


Sodium     (137-145)  mmol/L


 


Potassium     (3.5-5.1)  mmol/L


 


Chloride     ()  mmol/L


 


Carbon Dioxide     (22-30)  mmol/L


 


Anion Gap     mmol/L


 


BUN     (7-17)  mg/dL


 


Creatinine     (0.52-1.04)  mg/dL


 


Est GFR (CKD-EPI)AfAm     (>60 ml/min/1.73 sqM)  


 


Est GFR (CKD-EPI)NonAf     (>60 ml/min/1.73 sqM)  


 


Glucose     (74-99)  mg/dL


 


Calcium     (8.4-10.2)  mg/dL


 


Total Bilirubin     (0.2-1.3)  mg/dL


 


AST     (14-36)  U/L


 


ALT     (4-34)  U/L


 


Alkaline Phosphatase     ()  U/L


 


Total Protein     (6.3-8.2)  g/dL


 


Albumin     (3.5-5.0)  g/dL


 


Urine Color   Yellow   


 


Urine Appearance   Clear   (Clear)  


 


Urine pH   6.5   (5.0-8.0)  


 


Ur Specific Gravity   1.019   (1.001-1.035)  


 


Urine Protein   Negative   (Negative)  


 


Urine Glucose (UA)   Negative   (Negative)  


 


Urine Ketones   Negative   (Negative)  


 


Urine Blood   Negative   (Negative)  


 


Urine Nitrite   Negative   (Negative)  


 


Urine Bilirubin   Negative   (Negative)  


 


Urine Urobilinogen   <2.0   (<2.0)  mg/dL


 


Ur Leukocyte Esterase   Negative   (Negative)  


 


Urine HCG, Qual    Not Detected  (Not Detectd)  














  21 Range/Units





  16:01 


 


WBC   (3.8-10.6)  k/uL


 


RBC   (3.80-5.40)  m/uL


 


Hgb   (11.4-16.0)  gm/dL


 


Hct   (34.0-46.0)  %


 


MCV   (80.0-100.0)  fL


 


MCH   (25.0-35.0)  pg


 


MCHC   (31.0-37.0)  g/dL


 


RDW   (11.5-15.5)  %


 


Plt Count   (150-450)  k/uL


 


MPV   


 


Neutrophils %   %


 


Lymphocytes %   %


 


Monocytes %   %


 


Eosinophils %   %


 


Basophils %   %


 


Neutrophils #   (1.3-7.7)  k/uL


 


Lymphocytes #   (1.0-4.8)  k/uL


 


Monocytes #   (0-1.0)  k/uL


 


Eosinophils #   (0-0.7)  k/uL


 


Basophils #   (0-0.2)  k/uL


 


Sodium  139  (137-145)  mmol/L


 


Potassium  3.9  (3.5-5.1)  mmol/L


 


Chloride  104  ()  mmol/L


 


Carbon Dioxide  29  (22-30)  mmol/L


 


Anion Gap  6  mmol/L


 


BUN  8  (7-17)  mg/dL


 


Creatinine  0.72  (0.52-1.04)  mg/dL


 


Est GFR (CKD-EPI)AfAm  >90  (>60 ml/min/1.73 sqM)  


 


Est GFR (CKD-EPI)NonAf  >90  (>60 ml/min/1.73 sqM)  


 


Glucose  86  (74-99)  mg/dL


 


Calcium  9.4  (8.4-10.2)  mg/dL


 


Total Bilirubin  0.3  (0.2-1.3)  mg/dL


 


AST  25  (14-36)  U/L


 


ALT  23  (4-34)  U/L


 


Alkaline Phosphatase  67  ()  U/L


 


Total Protein  7.1  (6.3-8.2)  g/dL


 


Albumin  4.2  (3.5-5.0)  g/dL


 


Urine Color   


 


Urine Appearance   (Clear)  


 


Urine pH   (5.0-8.0)  


 


Ur Specific Gravity   (1.001-1.035)  


 


Urine Protein   (Negative)  


 


Urine Glucose (UA)   (Negative)  


 


Urine Ketones   (Negative)  


 


Urine Blood   (Negative)  


 


Urine Nitrite   (Negative)  


 


Urine Bilirubin   (Negative)  


 


Urine Urobilinogen   (<2.0)  mg/dL


 


Ur Leukocyte Esterase   (Negative)  


 


Urine HCG, Qual   (Not Detectd)  














Disposition


Clinical Impression: 


 Abdominal pain, Fatigue





Disposition: HOME SELF-CARE


Condition: Stable


Instructions (If sedation given, give patient instructions):  Dehydration (ED)


Additional Instructions: 


Please return to the Emergency Department if symptoms worsen or any other 

concerns.


Recommended increasing your fluid intake.  May take Zofran for any additional 

nausea.  


Please follow up with her primary care physician.


Is patient prescribed a controlled substance at d/c from ED?: No


Referrals: 


Eleazar Veras DO [Primary Care Provider] - 1-2 days


Time of Disposition: 16:46

## 2021-11-05 ENCOUNTER — HOSPITAL ENCOUNTER (EMERGENCY)
Dept: HOSPITAL 47 - EC | Age: 26
LOS: 1 days | Discharge: HOME | End: 2021-11-06
Payer: COMMERCIAL

## 2021-11-05 DIAGNOSIS — R10.9: ICD-10-CM

## 2021-11-05 DIAGNOSIS — Z72.89: ICD-10-CM

## 2021-11-05 DIAGNOSIS — F17.290: ICD-10-CM

## 2021-11-05 DIAGNOSIS — F41.9: ICD-10-CM

## 2021-11-05 DIAGNOSIS — O26.891: ICD-10-CM

## 2021-11-05 DIAGNOSIS — Z3A.01: ICD-10-CM

## 2021-11-05 DIAGNOSIS — F12.90: ICD-10-CM

## 2021-11-05 DIAGNOSIS — O23.41: Primary | ICD-10-CM

## 2021-11-05 DIAGNOSIS — O99.331: ICD-10-CM

## 2021-11-05 PROCEDURE — 81025 URINE PREGNANCY TEST: CPT

## 2021-11-05 PROCEDURE — 87086 URINE CULTURE/COLONY COUNT: CPT

## 2021-11-05 PROCEDURE — 81001 URINALYSIS AUTO W/SCOPE: CPT

## 2021-11-05 PROCEDURE — 80053 COMPREHEN METABOLIC PANEL: CPT

## 2021-11-05 PROCEDURE — 85025 COMPLETE CBC W/AUTO DIFF WBC: CPT

## 2021-11-05 PROCEDURE — 36415 COLL VENOUS BLD VENIPUNCTURE: CPT

## 2021-11-05 PROCEDURE — 76801 OB US < 14 WKS SINGLE FETUS: CPT

## 2021-11-05 PROCEDURE — 99284 EMERGENCY DEPT VISIT MOD MDM: CPT

## 2021-11-05 PROCEDURE — 84702 CHORIONIC GONADOTROPIN TEST: CPT

## 2021-11-06 VITALS — DIASTOLIC BLOOD PRESSURE: 60 MMHG | SYSTOLIC BLOOD PRESSURE: 116 MMHG | HEART RATE: 77 BPM | TEMPERATURE: 98.6 F

## 2021-11-06 VITALS — RESPIRATION RATE: 18 BRPM

## 2021-11-06 LAB
ALBUMIN SERPL-MCNC: 4.1 G/DL (ref 3.5–5)
ALP SERPL-CCNC: 69 U/L (ref 38–126)
ALT SERPL-CCNC: 47 U/L (ref 4–34)
ANION GAP SERPL CALC-SCNC: 9 MMOL/L
AST SERPL-CCNC: 28 U/L (ref 14–36)
BASOPHILS # BLD AUTO: 0 K/UL (ref 0–0.2)
BASOPHILS NFR BLD AUTO: 0 %
BUN SERPL-SCNC: 11 MG/DL (ref 7–17)
CALCIUM SPEC-MCNC: 9.4 MG/DL (ref 8.4–10.2)
CHLORIDE SERPL-SCNC: 104 MMOL/L (ref 98–107)
CO2 SERPL-SCNC: 22 MMOL/L (ref 22–30)
EOSINOPHIL # BLD AUTO: 0.2 K/UL (ref 0–0.7)
EOSINOPHIL NFR BLD AUTO: 2 %
ERYTHROCYTE [DISTWIDTH] IN BLOOD BY AUTOMATED COUNT: 4.27 M/UL (ref 3.8–5.4)
ERYTHROCYTE [DISTWIDTH] IN BLOOD: 12.8 % (ref 11.5–15.5)
GLUCOSE SERPL-MCNC: 85 MG/DL (ref 74–99)
HCG SERPL-MCNC: 6493 MIU/ML
HCT VFR BLD AUTO: 39.2 % (ref 34–46)
HGB BLD-MCNC: 12.7 GM/DL (ref 11.4–16)
LYMPHOCYTES # SPEC AUTO: 2.4 K/UL (ref 1–4.8)
LYMPHOCYTES NFR SPEC AUTO: 26 %
MCH RBC QN AUTO: 29.8 PG (ref 25–35)
MCHC RBC AUTO-ENTMCNC: 32.4 G/DL (ref 31–37)
MCV RBC AUTO: 91.8 FL (ref 80–100)
MONOCYTES # BLD AUTO: 0.4 K/UL (ref 0–1)
MONOCYTES NFR BLD AUTO: 4 %
NEUTROPHILS # BLD AUTO: 6 K/UL (ref 1.3–7.7)
NEUTROPHILS NFR BLD AUTO: 65 %
PH UR: 6 [PH] (ref 5–8)
PLATELET # BLD AUTO: 378 K/UL (ref 150–450)
POTASSIUM SERPL-SCNC: 4.4 MMOL/L (ref 3.5–5.1)
PROT SERPL-MCNC: 7.5 G/DL (ref 6.3–8.2)
RBC UR QL: 1 /HPF (ref 0–5)
SODIUM SERPL-SCNC: 135 MMOL/L (ref 137–145)
SP GR UR: 1.02 (ref 1–1.03)
SQUAMOUS UR QL AUTO: 3 /HPF (ref 0–4)
UROBILINOGEN UR QL STRIP: <2 MG/DL (ref ?–2)
WBC # BLD AUTO: 9.2 K/UL (ref 3.8–10.6)
WBC # UR AUTO: 49 /HPF (ref 0–5)

## 2021-11-06 NOTE — US
EXAMINATION TYPE: Transabdominal

 

DATE OF EXAM: 2021 1:26 AM

 

COMPARISON: NONE

 

CLINICAL HISTORY: Abd pain; early pregnancy. Just found out she was pregnant.  No spotting or crampin
g. 

 

EXAM PERFORMED:  Transabdominal (TA)

 

EXAM MEASUREMENTS:

 

GESTATIONAL AGE / DATING

 

Physician Established: Not yet established 

Dates by LMP: (5 weeks/3 days)  

** EDC: 2022

Dates by First Scan:  No previous this is first scan Dates by Current Scan for: Unable to date by reji vann's study 

 

MATERNAL ANATOMY 

Uterus: 9.4 x 6.9 x 4.9 cm

Right Ovary: 3.6 x 2.0 x 1.8 cm

Left Ovary: 3.8 x 2.4 x 1.6 cm

Post CDS / Adnexa: trace/small amount of fluid seen in cul de sac

Presence of free fluid: no

Presence of subchorionic bleed: no

 

GESTATION / FETAL SURVEY

 

CRL: Not visualized 

MSD: 1.0 cm, Too small for dates

IUP:  GS only seen in endometrium

 

Date of LMP: 2021, 

Beta HcG (if available): Not available at this time

 

GS visualized within endometrial cavity.  No YS or CRL seen. 

 

 

 

IMPRESSION:

 

Small intrauterine gestational sac. Follow-up exam recommended in 14 days to confirm a living fetus. 
No sign of ectopic pregnancy.

## 2021-11-06 NOTE — ED
Abdominal Pain HPI





- General


Chief Complaint: Abdominal Pain


Stated Complaint: Abd Pain


Time Seen by Provider: 21 23:13


Source: patient


Mode of arrival: ambulatory





- History of Present Illness


Initial Comments: 


26 year-old female patient presents to the emergency department for evaluation 

of lower abdominal pain and back pain. She also reports burning with urination. 

States that she has had pain for the last week. States the pain is bilateral, 

worse on the left. Reports occasional stabbing pain that wakes her from sleep. 

States now it is mild. She denies abnormal vaginal bleeding or discharge. States

there is possibility of pregnancy. LMP was 21. She is .  Patient denies

any recent rash, fever, chills, cough, shortness of breath, chest pain, nausea, 

vomiting, diarrhea, constipation, numbness, tingling, dizziness, weakness, 

headache, visual changes, or any other complaints.








- Related Data


                                  Previous Rx's











 Medication  Instructions  Recorded


 


Acetaminophen Tab [Tylenol] 650 mg PO Q4H PRN 7 Days #42 tab 20


 


Ibuprofen 600 mg PO Q8H PRN 7 Days #21 tab 20


 


predniSONE 20 mg PO DAILY 4 Days #8 tab 20


 


Cephalexin [Keflex] 500 mg PO Q8HR 7 Days #21 cap 21


 


Sulfamethox-Tmp 800-160Mg [Bactrim 2 tab PO BID #28 tab 21





-160 mg]  


 


Cephalexin [Keflex] 500 mg PO BID #14 cap 21











                                    Allergies











Allergy/AdvReac Type Severity Reaction Status Date / Time


 


No Known Allergies Allergy   Verified 21 23:00














Review of Systems


ROS Statement: 


Those systems with pertinent positive or pertinent negative responses have been 

documented in the HPI.





ROS Other: All systems not noted in ROS Statement are negative.





Past Medical History


Past Medical History: No Reported History


Additional Past Medical History / Comment(s): anxiety


History of Any Multi-Drug Resistant Organisms: None Reported


Past Surgical History:  Section, Orthopedic Surgery


Past Anesthesia/Blood Transfusion Reactions: No Reported Reaction


Additional Past Anesthesia/Blood Transfusion Reaction / Comment(s): no hx blood 

transfusion


Past Psychological History: Anxiety


Smoking Status: Vaper


Past Alcohol Use History: None Reported, Occasional


Past Drug Use History: Marijuana





- Past Family History


  ** Mother


Family Medical History: No Reported History





  ** Father


Family Medical History: No Reported History





General Exam


General appearance: alert, in no apparent distress, other (This is a well-

developed, well-nourished adult female patient in no acute distress.  Vital 

signs upon presentation temperature 99.1F, pulse 71, respirations 19, blood 

pressure 103/61, pulse ox 100% on room air.)


Eye exam: Present: normal appearance, PERRL, EOMI.  Absent: scleral icterus, 

conjunctival injection, periorbital swelling


ENT exam: Present: normal exam, normal oropharynx, mucous membranes moist


Respiratory exam: Present: normal lung sounds bilaterally.  Absent: respiratory 

distress, wheezes, rales, rhonchi, stridor


Cardiovascular Exam: Present: regular rate, normal rhythm, normal heart sounds. 

 Absent: systolic murmur, diastolic murmur, rubs, gallop, clicks


GI/Abdominal exam: Present: soft, normal bowel sounds.  Absent: distended, 

tenderness, guarding, rebound, rigid


Back exam: Present: normal inspection.  Absent: CVA tenderness (R), CVA 

tenderness (L)


Neurological exam: Present: alert, oriented X3, CN II-XII intact


Psychiatric exam: Present: normal affect, normal mood


Skin exam: Present: warm, dry, intact, normal color.  Absent: rash





Course


                                   Vital Signs











  21





  23:00


 


Temperature 99.1 F


 


Pulse Rate 71


 


Respiratory 19





Rate 


 


Blood Pressure 103/61


 


O2 Sat by Pulse 100





Oximetry 














Medical Decision Making





- Medical Decision Making


26-year-old female patient presenting to the emergency department today for 

evaluation of intermittent lower abdominal pain, burning with urination, back 

pain that started approximately a week ago.  Physical examination did reveal a 

soft nontender abdomen.  No CVA tenderness.  She is afebrile normal vital signs.

  Urinalysis was obtained and did show 49 white blood cells and some bacteria 

consistent with UTI.  She did test positive for pregnancy.  Given her pain we 

did draw labs, hCG is around 6000.  Ultrasound was obtained and showed evidence 

for gestational sac no ectopic pregnancy.  They are recommending repeat 

ultrasound in 2 weeks to confirm normal fetal development as there is no 

evidence for previous or yolk sac on the ultrasound at this time.  She is not 

having any vaginal bleeding.  I did discuss these findings and results with her.

  Discussed this could be too early pregnancy.  She decided to follow-up with 

her OB/GYN for recheck as soon as possible.  She does see Dr. Solares.  Return 

parameters were discussed in detail.  She verbalizes understanding and agrees 

with this plan.  Case discussed with my attending Dr. Berry.





- Lab Data


Result diagrams: 


                                 21 01:12





                                 21 01:12


                                   Lab Results











  21 Range/Units





  00:04 00:04 01:12 


 


WBC    9.2  (3.8-10.6)  k/uL


 


RBC    4.27  (3.80-5.40)  m/uL


 


Hgb    12.7  (11.4-16.0)  gm/dL


 


Hct    39.2  (34.0-46.0)  %


 


MCV    91.8  (80.0-100.0)  fL


 


MCH    29.8  (25.0-35.0)  pg


 


MCHC    32.4  (31.0-37.0)  g/dL


 


RDW    12.8  (11.5-15.5)  %


 


Plt Count    378  (150-450)  k/uL


 


MPV    6.6  


 


Neutrophils %    65  %


 


Lymphocytes %    26  %


 


Monocytes %    4  %


 


Eosinophils %    2  %


 


Basophils %    0  %


 


Neutrophils #    6.0  (1.3-7.7)  k/uL


 


Lymphocytes #    2.4  (1.0-4.8)  k/uL


 


Monocytes #    0.4  (0-1.0)  k/uL


 


Eosinophils #    0.2  (0-0.7)  k/uL


 


Basophils #    0.0  (0-0.2)  k/uL


 


Sodium     (137-145)  mmol/L


 


Potassium     (3.5-5.1)  mmol/L


 


Chloride     ()  mmol/L


 


Carbon Dioxide     (22-30)  mmol/L


 


Anion Gap     mmol/L


 


BUN     (7-17)  mg/dL


 


Creatinine     (0.52-1.04)  mg/dL


 


Est GFR (CKD-EPI)AfAm     (>60 ml/min/1.73 sqM)  


 


Est GFR (CKD-EPI)NonAf     (>60 ml/min/1.73 sqM)  


 


Glucose     (74-99)  mg/dL


 


Calcium     (8.4-10.2)  mg/dL


 


Total Bilirubin     (0.2-1.3)  mg/dL


 


AST     (14-36)  U/L


 


ALT     (4-34)  U/L


 


Alkaline Phosphatase     ()  U/L


 


Total Protein     (6.3-8.2)  g/dL


 


Albumin     (3.5-5.0)  g/dL


 


HCG, Quant     mIU/mL


 


Urine Color  Light Yellow    


 


Urine Appearance  Clear    (Clear)  


 


Urine pH  6.0    (5.0-8.0)  


 


Ur Specific Gravity  1.018    (1.001-1.035)  


 


Urine Protein  Negative    (Negative)  


 


Urine Glucose (UA)  Negative    (Negative)  


 


Urine Ketones  Negative    (Negative)  


 


Urine Blood  Negative    (Negative)  


 


Urine Nitrite  Negative    (Negative)  


 


Urine Bilirubin  Negative    (Negative)  


 


Urine Urobilinogen  <2.0    (<2.0)  mg/dL


 


Ur Leukocyte Esterase  Large H    (Negative)  


 


Urine RBC  1    (0-5)  /hpf


 


Urine WBC  49 H    (0-5)  /hpf


 


Ur Squamous Epith Cells  3    (0-4)  /hpf


 


Urine Bacteria  Occasional H    (None)  /hpf


 


Urine Mucus  Rare H    (None)  /hpf


 


Urine HCG, Qual   Detected   (Not Detectd)  














  21 Range/Units





  01:12 


 


WBC   (3.8-10.6)  k/uL


 


RBC   (3.80-5.40)  m/uL


 


Hgb   (11.4-16.0)  gm/dL


 


Hct   (34.0-46.0)  %


 


MCV   (80.0-100.0)  fL


 


MCH   (25.0-35.0)  pg


 


MCHC   (31.0-37.0)  g/dL


 


RDW   (11.5-15.5)  %


 


Plt Count   (150-450)  k/uL


 


MPV   


 


Neutrophils %   %


 


Lymphocytes %   %


 


Monocytes %   %


 


Eosinophils %   %


 


Basophils %   %


 


Neutrophils #   (1.3-7.7)  k/uL


 


Lymphocytes #   (1.0-4.8)  k/uL


 


Monocytes #   (0-1.0)  k/uL


 


Eosinophils #   (0-0.7)  k/uL


 


Basophils #   (0-0.2)  k/uL


 


Sodium  135 L  (137-145)  mmol/L


 


Potassium  4.4  (3.5-5.1)  mmol/L


 


Chloride  104  ()  mmol/L


 


Carbon Dioxide  22  (22-30)  mmol/L


 


Anion Gap  9  mmol/L


 


BUN  11  (7-17)  mg/dL


 


Creatinine  0.70  (0.52-1.04)  mg/dL


 


Est GFR (CKD-EPI)AfAm  >90  (>60 ml/min/1.73 sqM)  


 


Est GFR (CKD-EPI)NonAf  >90  (>60 ml/min/1.73 sqM)  


 


Glucose  85  (74-99)  mg/dL


 


Calcium  9.4  (8.4-10.2)  mg/dL


 


Total Bilirubin  0.2  (0.2-1.3)  mg/dL


 


AST  28  (14-36)  U/L


 


ALT  47 H  (4-34)  U/L


 


Alkaline Phosphatase  69  ()  U/L


 


Total Protein  7.5  (6.3-8.2)  g/dL


 


Albumin  4.1  (3.5-5.0)  g/dL


 


HCG, Quant  6493.0  mIU/mL


 


Urine Color   


 


Urine Appearance   (Clear)  


 


Urine pH   (5.0-8.0)  


 


Ur Specific Gravity   (1.001-1.035)  


 


Urine Protein   (Negative)  


 


Urine Glucose (UA)   (Negative)  


 


Urine Ketones   (Negative)  


 


Urine Blood   (Negative)  


 


Urine Nitrite   (Negative)  


 


Urine Bilirubin   (Negative)  


 


Urine Urobilinogen   (<2.0)  mg/dL


 


Ur Leukocyte Esterase   (Negative)  


 


Urine RBC   (0-5)  /hpf


 


Urine WBC   (0-5)  /hpf


 


Ur Squamous Epith Cells   (0-4)  /hpf


 


Urine Bacteria   (None)  /hpf


 


Urine Mucus   (None)  /hpf


 


Urine HCG, Qual   (Not Detectd)  














- Radiology Data


Radiology results: report reviewed, image reviewed


Transabdominal ultrasound was obtained.  Report was reviewed in its entirety.  

Impression by Dr. Doyle shows small intrauterine gestational sac.  Follow-up

 exam recommended in 14 days to confirm a living fetus.  No sign of ectopic 

pregnancy.





Disposition


Clinical Impression: 


 UTI (urinary tract infection), Abdominal pain during pregnancy





Disposition: HOME SELF-CARE


Condition: Good


Instructions (If sedation given, give patient instructions):  Abdominal Pain in 

Pregnancy (ED), Urinary Tract Infection in Pregnancy (ED)


Additional Instructions: 


Follow-up with OB/GYN for recheck as soon as possible, repeat ultrasound in 2 

weeks.  Return to the emergency department for any new, worsening, or concerning

 symptoms.


Prescriptions: 


Cephalexin [Keflex] 500 mg PO BID #14 cap


Is patient prescribed a controlled substance at d/c from ED?: No


Referrals: 


Km Garcia MD [Primary Care Provider] - 1-2 days


Oswaldo Suárez DO [Doctor of Osteopathic Medicine] - 1-2 days


Time of Disposition: 02:28

## 2021-11-10 ENCOUNTER — HOSPITAL ENCOUNTER (OUTPATIENT)
Dept: HOSPITAL 47 - LABWHC1 | Age: 26
Discharge: HOME | End: 2021-11-10
Attending: OBSTETRICS & GYNECOLOGY
Payer: COMMERCIAL

## 2021-11-10 DIAGNOSIS — O20.0: Primary | ICD-10-CM

## 2021-11-10 DIAGNOSIS — Z3A.00: ICD-10-CM

## 2021-11-10 PROCEDURE — 84702 CHORIONIC GONADOTROPIN TEST: CPT

## 2021-11-10 PROCEDURE — 36415 COLL VENOUS BLD VENIPUNCTURE: CPT

## 2021-12-03 ENCOUNTER — HOSPITAL ENCOUNTER (OUTPATIENT)
Dept: HOSPITAL 47 - LABWHC1 | Age: 26
Discharge: HOME | End: 2021-12-03
Attending: OBSTETRICS & GYNECOLOGY
Payer: COMMERCIAL

## 2021-12-03 DIAGNOSIS — Z3A.00: ICD-10-CM

## 2021-12-03 DIAGNOSIS — Z34.81: Primary | ICD-10-CM

## 2021-12-03 PROCEDURE — 86900 BLOOD TYPING SEROLOGIC ABO: CPT

## 2021-12-03 PROCEDURE — 86901 BLOOD TYPING SEROLOGIC RH(D): CPT

## 2021-12-03 PROCEDURE — 85027 COMPLETE CBC AUTOMATED: CPT

## 2021-12-03 PROCEDURE — 86850 RBC ANTIBODY SCREEN: CPT

## 2021-12-03 PROCEDURE — 82947 ASSAY GLUCOSE BLOOD QUANT: CPT

## 2021-12-03 PROCEDURE — 87390 HIV-1 AG IA: CPT

## 2021-12-03 PROCEDURE — 82565 ASSAY OF CREATININE: CPT

## 2021-12-03 PROCEDURE — 86762 RUBELLA ANTIBODY: CPT

## 2021-12-03 PROCEDURE — 36415 COLL VENOUS BLD VENIPUNCTURE: CPT

## 2021-12-03 PROCEDURE — 87340 HEPATITIS B SURFACE AG IA: CPT

## 2021-12-03 PROCEDURE — 86780 TREPONEMA PALLIDUM: CPT

## 2021-12-04 LAB
ERYTHROCYTE [DISTWIDTH] IN BLOOD BY AUTOMATED COUNT: 3.83 X 10*6/UL (ref 4.1–5.2)
ERYTHROCYTE [DISTWIDTH] IN BLOOD: 13.2 % (ref 11.5–14.5)
GLUCOSE SERPL-MCNC: 97 MG/DL (ref 70–110)
HCT VFR BLD AUTO: 36.1 % (ref 37.2–46.3)
HGB BLD-MCNC: 11.2 G/DL (ref 12–15)
MCH RBC QN AUTO: 29.2 PG (ref 27–32)
MCHC RBC AUTO-ENTMCNC: 31 G/DL (ref 32–37)
MCV RBC AUTO: 94.3 FL (ref 80–97)
PLATELET # BLD AUTO: 318 X 10*3/UL (ref 140–440)
WBC # BLD AUTO: 8.4 X 10*3/UL (ref 4.5–10)

## 2022-01-26 ENCOUNTER — HOSPITAL ENCOUNTER (OUTPATIENT)
Dept: HOSPITAL 47 - LABWHC1 | Age: 27
Discharge: HOME | End: 2022-01-26
Attending: OBSTETRICS & GYNECOLOGY
Payer: COMMERCIAL

## 2022-01-26 DIAGNOSIS — K81.9: Primary | ICD-10-CM

## 2022-01-26 PROCEDURE — 84460 ALANINE AMINO (ALT) (SGPT): CPT

## 2022-01-26 PROCEDURE — 36415 COLL VENOUS BLD VENIPUNCTURE: CPT

## 2022-01-26 PROCEDURE — 84450 TRANSFERASE (AST) (SGOT): CPT

## 2022-01-26 PROCEDURE — 82239 BILE ACIDS TOTAL: CPT

## 2022-01-27 LAB
ALT SERPL-CCNC: 29 U/L (ref 8–44)
AST SERPL-CCNC: 33 U/L (ref 13–35)

## 2022-04-03 ENCOUNTER — HOSPITAL ENCOUNTER (OUTPATIENT)
Dept: HOSPITAL 47 - FBPOP | Age: 27
Discharge: HOME | End: 2022-04-03
Attending: OBSTETRICS & GYNECOLOGY
Payer: COMMERCIAL

## 2022-04-03 VITALS
RESPIRATION RATE: 16 BRPM | SYSTOLIC BLOOD PRESSURE: 110 MMHG | HEART RATE: 88 BPM | TEMPERATURE: 97.7 F | DIASTOLIC BLOOD PRESSURE: 56 MMHG

## 2022-04-03 DIAGNOSIS — F17.290: ICD-10-CM

## 2022-04-03 DIAGNOSIS — O99.332: ICD-10-CM

## 2022-04-03 DIAGNOSIS — Z3A.26: ICD-10-CM

## 2022-04-03 DIAGNOSIS — O21.2: Primary | ICD-10-CM

## 2022-04-03 PROCEDURE — 96361 HYDRATE IV INFUSION ADD-ON: CPT

## 2022-04-03 PROCEDURE — 99214 OFFICE O/P EST MOD 30 MIN: CPT

## 2022-04-03 PROCEDURE — 96374 THER/PROPH/DIAG INJ IV PUSH: CPT

## 2022-04-04 NOTE — P.MSEPDOC
Presenting Problems





- Arrival Data


Date of Arrival on Unit: 22


Time of Arrival on Unit: 12:37


Mode of Transport: Ambulatory





- Complaint


OB-Reason for Admission/Chief Complaint: Acute Nausea/Vomiting


Comment: cramping with nausea and vomiting





Prenatal Medical History





- Pregnancy Information


: 3


Para: 2


Term: 2


: 0


Abortions: Spontaneous or Elective: 0


Number of Living Children: 2





- Gestational Age


Gestational Age by NOHEMI (wks/days): 26 Weeks and 4 Days





- Prenatal History


Pregnancy Complications: Smoker


Comment: vaping





Review of Systems





- Review of Systems


Constitutional: No problems


Breast: No problems


ENT: No problems


Cardiovascular: No problems


Respiratory: No problems


Gastrointestinal: No problems


Genitourinary: No problems


Musculoskeletal: No problems


Neurological: No problems


Skin: No problems


Comment: vomiting





Vital Signs





- Temperature


Temperature: 97.7 F


Temperature Source: Oral





- Pulse


  ** Right


Pulse Rate: 88


Pulse Assessment Method: Automatic Cuff





- Respirations


Respiratory Rate: 16


Oxygen Delivery Method: Room Air


O2 Sat by Pulse Oximetry: 97





- Blood Pressure


  ** Right Arm


Blood Pressure: 110/56


Blood Pressure Mean: 74


Blood Pressure Source: Automatic Cuff





Medical Screen Scoring





- Fetal Assessment - Baby A


Baseline FHR: 145


Fetal Heart Rate - NICHD Category: Category I (Normal)





Physician Notification





- Physician Notified


Physician Notified Date: 22


Physician Notified Time: 13:02


Physician: Mayank Mota Order Received: Yes





- Notification Comment


Comment: RN reported to Dr. Mota maternal assessment, NV and unable to keep 

anything down, abdomen soft to palpation, pt states that her cramping happens 

when she feels that she is about to throw up, FHR is 140-155bpm. RN to 

administer 1L IVF, and pt may have zofran if the 1L fluid does not make her feel

better. If pt feels better after 1L IVF, she may DC home with her regular 

routine follow up.





Maternal Fetal Triage Index





- Maternal Fetal Triage Index


Presenting for scheduled procedure w/no complaint: No





- Stat/Priority 1


Stat Priority 1: No





- Urgent/Priority 2


Urgent Priority 2: No





- Prompt/Priority 3


Prompt Priority 3: No





- Non-Urgent/Priority 4


Non-Urgent Priority 4: Yes


Criteria Met for Priority 4: acute nausea and vomiting with abdominal cramps





Disposition





- Disposition


OB Disposition: Triage


Discharge Date: 22


Discharge Time: 14:00


I agree with the RN Medical Screening Exam: Yes


Case reviewed; plan agreed upon as documented in EMR&OBIX.: Yes


Diagnosis: VOMITING OF PREGNANCY, UNSPECIFIED

## 2022-05-20 ENCOUNTER — HOSPITAL ENCOUNTER (EMERGENCY)
Dept: HOSPITAL 47 - EC | Age: 27
Discharge: LEFT BEFORE DISCHARGE | End: 2022-05-20
Payer: COMMERCIAL

## 2022-05-20 VITALS
RESPIRATION RATE: 18 BRPM | DIASTOLIC BLOOD PRESSURE: 61 MMHG | TEMPERATURE: 98 F | HEART RATE: 80 BPM | SYSTOLIC BLOOD PRESSURE: 96 MMHG

## 2022-05-20 DIAGNOSIS — J06.9: Primary | ICD-10-CM

## 2022-05-20 DIAGNOSIS — F17.209: ICD-10-CM

## 2022-05-20 PROCEDURE — 87502 INFLUENZA DNA AMP PROBE: CPT

## 2022-05-20 NOTE — ED
ENT HPI





- General


Chief complaint: ENT


Stated complaint: Sore  throat/cough


Time Seen by Provider: 22 10:53


Source: patient, RN notes reviewed


Mode of arrival: ambulatory


Limitations: no limitations





- History of Present Illness


Initial comments: 


26-year-old female presents emergency Department with chief complaint of cough 

congestion sore throat year pain nausea.  Patient states that symptoms started 

2-3 days ago.  No reported fever mild chills body aches.  Patient denies any 

shortness breath she does have a cough minimally productive.  Patient states she

started 3 weeks pregnant no sick contacts.  No abdominal complaints no pregnancy

complaints.








- Related Data


                                Home Medications











 Medication  Instructions  Recorded  Confirmed


 


D-Methorphan/PE/Acetaminophen 2 tab PO AS DIRECTED 22





[Tylenol Cold Multi-Symp Caplet]   











                                    Allergies











Allergy/AdvReac Type Severity Reaction Status Date / Time


 


No Known Allergies Allergy   Verified 22 11:14














Review of Systems


ROS Statement: 


Those systems with pertinent positive or pertinent negative responses have been 

documented in the HPI.





ROS Other: All systems not noted in ROS Statement are negative.





Past Medical History


Past Medical History: No Reported History


Additional Past Medical History / Comment(s): anxiety


History of Any Multi-Drug Resistant Organisms: None Reported


Past Surgical History:  Section, Orthopedic Surgery


Past Anesthesia/Blood Transfusion Reactions: No Reported Reaction


Additional Past Anesthesia/Blood Transfusion Reaction / Comment(s): no hx blood 

transfusion


Past Psychological History: Anxiety


Smoking Status: Vaper


Past Alcohol Use History: None Reported


Past Drug Use History: None Reported





- Past Family History


  ** Mother


Family Medical History: No Reported History





  ** Father


Family Medical History: No Reported History





General Exam


Limitations: no limitations


General appearance: alert, in no apparent distress


Head exam: Present: atraumatic, normocephalic, normal inspection


Eye exam: Present: normal appearance, PERRL, EOMI.  Absent: scleral icterus, 

conjunctival injection, periorbital swelling


ENT exam: Present: mucous membranes moist.  Absent: normal exam, normal 

oropharynx (Postnasal drainage), TM's normal bilaterally (Mild fluid no 

erythema)


Neck exam: Present: normal inspection, full ROM.  Absent: tenderness, 

meningismus, lymphadenopathy


Respiratory exam: Present: normal lung sounds bilaterally.  Absent: respiratory 

distress, wheezes, rales, rhonchi, stridor


Cardiovascular Exam: Present: regular rate, normal rhythm, normal heart sounds. 

Absent: systolic murmur, diastolic murmur, rubs, gallop, clicks





Course


                                   Vital Signs











  22





  10:24


 


Temperature 98 F


 


Pulse Rate 80


 


Respiratory 18





Rate 


 


Blood Pressure 96/61


 


O2 Sat by Pulse 96





Oximetry 














Medical Decision Making





- Medical Decision Making





Patient left AMA.





- Lab Data


                                   Lab Results











  22 Range/Units





  10:30 


 


Influenza Type A RNA  Not Detected  (Not Detectd)  


 


Influenza Type B (PCR)  Not Detected  (Not Detectd)  














Disposition


Clinical Impression: 


 URI (upper respiratory infection)





Disposition: Left Against Medical Advice


Referrals: 


None,Stated [Primary Care Provider] - 1-2 days

## 2022-06-30 ENCOUNTER — HOSPITAL ENCOUNTER (INPATIENT)
Dept: HOSPITAL 47 - 4FBP | Age: 27
LOS: 2 days | Discharge: HOME | End: 2022-07-02
Attending: OBSTETRICS & GYNECOLOGY | Admitting: OBSTETRICS & GYNECOLOGY
Payer: COMMERCIAL

## 2022-06-30 DIAGNOSIS — Z87.891: ICD-10-CM

## 2022-06-30 DIAGNOSIS — U07.1: ICD-10-CM

## 2022-06-30 DIAGNOSIS — O34.211: Primary | ICD-10-CM

## 2022-06-30 DIAGNOSIS — E66.01: ICD-10-CM

## 2022-06-30 DIAGNOSIS — F41.9: ICD-10-CM

## 2022-06-30 DIAGNOSIS — Z71.89: ICD-10-CM

## 2022-06-30 DIAGNOSIS — Z3A.39: ICD-10-CM

## 2022-06-30 LAB
BASOPHILS # BLD AUTO: 0 K/UL (ref 0–0.2)
BASOPHILS NFR BLD AUTO: 0 %
EOSINOPHIL # BLD AUTO: 0.1 K/UL (ref 0–0.7)
EOSINOPHIL NFR BLD AUTO: 1 %
ERYTHROCYTE [DISTWIDTH] IN BLOOD BY AUTOMATED COUNT: 3.13 M/UL (ref 3.8–5.4)
ERYTHROCYTE [DISTWIDTH] IN BLOOD: 14.2 % (ref 11.5–15.5)
HCT VFR BLD AUTO: 27.9 % (ref 34–46)
HGB BLD-MCNC: 9 GM/DL (ref 11.4–16)
LYMPHOCYTES # SPEC AUTO: 1.1 K/UL (ref 1–4.8)
LYMPHOCYTES NFR SPEC AUTO: 20 %
MCH RBC QN AUTO: 28.7 PG (ref 25–35)
MCHC RBC AUTO-ENTMCNC: 32.2 G/DL (ref 31–37)
MCV RBC AUTO: 89 FL (ref 80–100)
MONOCYTES # BLD AUTO: 0.3 K/UL (ref 0–1)
MONOCYTES NFR BLD AUTO: 6 %
NEUTROPHILS # BLD AUTO: 4.1 K/UL (ref 1.3–7.7)
NEUTROPHILS NFR BLD AUTO: 71 %
PLATELET # BLD AUTO: 252 K/UL (ref 150–450)
WBC # BLD AUTO: 5.8 K/UL (ref 3.8–10.6)

## 2022-06-30 PROCEDURE — 86140 C-REACTIVE PROTEIN: CPT

## 2022-06-30 PROCEDURE — 71275 CT ANGIOGRAPHY CHEST: CPT

## 2022-06-30 PROCEDURE — 80053 COMPREHEN METABOLIC PANEL: CPT

## 2022-06-30 PROCEDURE — 88307 TISSUE EXAM BY PATHOLOGIST: CPT

## 2022-06-30 PROCEDURE — 86901 BLOOD TYPING SEROLOGIC RH(D): CPT

## 2022-06-30 PROCEDURE — 85379 FIBRIN DEGRADATION QUANT: CPT

## 2022-06-30 PROCEDURE — 86850 RBC ANTIBODY SCREEN: CPT

## 2022-06-30 PROCEDURE — 86900 BLOOD TYPING SEROLOGIC ABO: CPT

## 2022-06-30 PROCEDURE — 71046 X-RAY EXAM CHEST 2 VIEWS: CPT

## 2022-06-30 PROCEDURE — 87635 SARS-COV-2 COVID-19 AMP PRB: CPT

## 2022-06-30 PROCEDURE — 71045 X-RAY EXAM CHEST 1 VIEW: CPT

## 2022-06-30 PROCEDURE — 85025 COMPLETE CBC W/AUTO DIFF WBC: CPT

## 2022-06-30 RX ADMIN — IBUPROFEN SCH: 600 TABLET ORAL at 21:15

## 2022-06-30 RX ADMIN — ACETAMINOPHEN SCH MG: 500 TABLET ORAL at 13:38

## 2022-06-30 RX ADMIN — POTASSIUM CHLORIDE SCH MLS/HR: 14.9 INJECTION, SOLUTION INTRAVENOUS at 06:49

## 2022-06-30 RX ADMIN — KETOROLAC TROMETHAMINE PRN MG: 15 INJECTION, SOLUTION INTRAMUSCULAR; INTRAVENOUS at 16:39

## 2022-06-30 RX ADMIN — KETOROLAC TROMETHAMINE PRN MG: 15 INJECTION, SOLUTION INTRAMUSCULAR; INTRAVENOUS at 22:52

## 2022-06-30 RX ADMIN — IBUPROFEN SCH: 600 TABLET ORAL at 16:41

## 2022-06-30 RX ADMIN — DOCUSATE SODIUM AND SENNOSIDES SCH EACH: 50; 8.6 TABLET ORAL at 19:38

## 2022-06-30 RX ADMIN — POTASSIUM CHLORIDE SCH MLS/HR: 14.9 INJECTION, SOLUTION INTRAVENOUS at 16:41

## 2022-06-30 RX ADMIN — ACETAMINOPHEN SCH MG: 500 TABLET ORAL at 19:38

## 2022-06-30 NOTE — P.OP
Date of Procedure: 22


Preoperative Diagnosis: 


1.   at 39 weeks


2.  Previous  section


Postoperative Diagnosis: 


1.   at 39 weeks


2.  Previous  section


3.  Coronavirus positive


Procedure(s) Performed: 


Repeat low transverse 


Anesthesia: GETA, spinal


Surgeon: Damaris Bauer


Assistant #1: Callie Roberson


Estimated Blood Loss (ml): 430


IV fluids (ml): 800


Urine output (ml): 200


Pathology: other (Percent to)


Condition: stable


Disposition: floor


Operative Findings: 


Viable male, Apgars 9, 9, weight 8 lbs. 5 oz.  Normal uterus, tubes, ovaries.  

Covid positive


Description of Procedure: 


Patient was taken to the operating room where spinal anesthesia was found be 

inadequate.  Informed consent was obtained and then patient was put under 

general anesthesia without complication.  At this time the coated test done 

earlier came back positive.  She was prepped and draped in normal sterile 

fashion in dorsal supine position with a leftward tilt.  Pfannenstiel skin 

incision was made the scalpel and carried through to the underlying layer of 

fascia with the scalpel.  Fascia was incised in midline and carried bilaterally 

with the Pineda scissors.  The superior aspect of the fascial incision was grasped

with [place] clamps elevated and the underlying rectus muscles dissected off with 

the Pineda's.  Attention was then turned to inferior aspect of same incision which

in a similar fashion was grasped tented up and the underlying rectus muscles 

dissected off with the Pineda's.  The rectus muscles were  the midline 

and the peritoneum was identified tented up and entered sharply with the 

scalpel.  The incision was extended superiorly and inferiorly with good 

visualization of the bladder.  The bladder blade was inserted and the 

vesicouterine peritoneum was incised the Metzenbaums then carried bilaterally 

and bladder flap created digitally.  A low transverse incision was then made on 

the uterus with the scalpel.  This was carried bilaterally and digital manner.  

Infant's head delivered atraumatically, nose and mouth bulb suctioned, cord 

clamped and cut, infant handed off to waiting nurses.  Apgars 9, 9, weight 8 

lbs. 5 oz. Placenta delivered manually, intact with three-vessel cord.  The 

uterus is exteriorized and cleared of all clots and debris.  The uterine 

incision was closed with 0 Vicryl in a running locked fashion.  Second layer of 

the same sutures used in imbricating fashion to obtain excellent hemostasis.  

Bladder flap was then reapproximated using 2-0 Vicryl in a running fashion.  

Both ovaries and tubes appeared normal.  The uterus was placed back into the 

abdomen.  The peritoneum was reapproximated using 2-0 Vicryl in a running 

fashion.  The muscles were reapproximated using 2-0 Vicryl in interrupted 

fashion.  The fascia was reapproximated using 0 Vicryl in a running fashion.  

The subcutaneous tissues closed with 3-0 Vicryl running fashion.  The skin was 

closed staples.  Patient tolerated the procedure well, sponge and instrument 

counts were correct times 2 and she was taken to the recovery room in stable 

condition.

## 2022-06-30 NOTE — P.HPOB
History of Present Illness


H&P Date: 22


Chief Complaint: Repeat low transverse 





26-year-old  presents at 39 weeks for repeat low transverse .





Review of Systems


All systems: negative


Constitutional: Denies chills, Denies fever


Eyes: denies blurred vision, denies pain


Ears, nose, mouth and throat: Denies headache, Denies sore throat


Cardiovascular: Denies chest pain, Denies shortness of breath


Respiratory: Denies cough


Gastrointestinal: Denies abdominal pain, Denies diarrhea, Denies nausea, Denies 

vomiting


Genitourinary: Denies dysuria, Denies hematuria


Musculoskeletal: Denies myalgias


Integumentary: Denies pruritus, Denies rash


Neurological: Denies numbness, Denies weakness


Psychiatric: Denies anxiety, Denies depression


Endocrine: Denies fatigue, Denies weight change





Past Medical History


Past Medical History: No Reported History


Additional Past Medical History / Comment(s): anxiety


History of Any Multi-Drug Resistant Organisms: None Reported


Past Surgical History:  Section, Orthopedic Surgery


Past Anesthesia/Blood Transfusion Reactions: No Reported Reaction


Additional Past Anesthesia/Blood Transfusion Reaction / Comment(s): no hx blood 

transfusion


Past Psychological History: Anxiety


Smoking Status: Vaper


Past Alcohol Use History: None Reported


Additional Past Alcohol Use History / Comment(s): started smoking 18,<1/2ppd


Past Drug Use History: None Reported





- Past Family History


  ** Mother


Family Medical History: No Reported History





  ** Father


Family Medical History: No Reported History





Medications and Allergies


                                Home Medications











 Medication  Instructions  Recorded  Confirmed  Type


 


D-Methorphan/PE/Acetaminophen 2 tab PO AS DIRECTED PRN 22 History





[Tylenol Cold Multi-Symp Caplet]    








                                    Allergies











Allergy/AdvReac Type Severity Reaction Status Date / Time


 


No Known Allergies Allergy   Verified 22 06:19














Exam


Osteopathic Statement: *.  No significant issues noted on an osteopathic 

structural exam other than those noted in the History and Physical/Consult.


                                   Vital Signs











  Temp Pulse Resp BP Pulse Ox


 


 22 06:42  95.7 F L  83  16  115/57  97








                                Intake and Output











 22





 22:59 06:59 14:59


 


Other:   


 


  Weight  110.223 kg 














Heart: Regular rate and rhythm


Lungs: Clear to auscultation bilaterally


Abdomen: Soft, nontender


Extremities: Negative Homans sign





Results


Result Diagrams: 


                                 22 06:50





                  Abnormal Lab Results - Last 24 Hours (Table)











  22 Range/Units





  06:50 07:30 


 


RBC  3.13 L   (3.80-5.40)  m/uL


 


Hgb  9.0 L   (11.4-16.0)  gm/dL


 


Hct  27.9 L   (34.0-46.0)  %


 


Coronavirus (PCR)   Detected A  (Not Detectd)  














Assessment and Plan


(1) 39 weeks gestation of pregnancy


Current Visit: Yes   Status: Acute   Code(s): Z3A.39 - 39 WEEKS GESTATION OF 

PREGNANCY   SNOMED Code(s): 76589339


   





(2) Previous  section


Current Visit: Yes   Status: Acute   Code(s): Z98.891 - HISTORY OF UTERINE SCAR 

FROM PREVIOUS SURGERY   SNOMED Code(s): 088475608


   


Plan: 





1.  Repeat low transverse

## 2022-07-01 LAB
ALBUMIN SERPL-MCNC: 2.5 G/DL (ref 3.5–5)
ALP SERPL-CCNC: 155 U/L (ref 38–126)
ALT SERPL-CCNC: 15 U/L (ref 4–34)
ANION GAP SERPL CALC-SCNC: 3 MMOL/L
AST SERPL-CCNC: 26 U/L (ref 14–36)
BASOPHILS # BLD AUTO: 0 K/UL (ref 0–0.2)
BASOPHILS NFR BLD AUTO: 0 %
BUN SERPL-SCNC: 6 MG/DL (ref 7–17)
CALCIUM SPEC-MCNC: 8.2 MG/DL (ref 8.4–10.2)
CHLORIDE SERPL-SCNC: 108 MMOL/L (ref 98–107)
CO2 SERPL-SCNC: 24 MMOL/L (ref 22–30)
EOSINOPHIL # BLD AUTO: 0 K/UL (ref 0–0.7)
EOSINOPHIL NFR BLD AUTO: 0 %
ERYTHROCYTE [DISTWIDTH] IN BLOOD BY AUTOMATED COUNT: 2.86 M/UL (ref 3.8–5.4)
ERYTHROCYTE [DISTWIDTH] IN BLOOD: 14.6 % (ref 11.5–15.5)
GLUCOSE SERPL-MCNC: 88 MG/DL (ref 74–99)
HCT VFR BLD AUTO: 25.5 % (ref 34–46)
HGB BLD-MCNC: 8.4 GM/DL (ref 11.4–16)
LYMPHOCYTES # SPEC AUTO: 1.6 K/UL (ref 1–4.8)
LYMPHOCYTES NFR SPEC AUTO: 22 %
MCH RBC QN AUTO: 29.5 PG (ref 25–35)
MCHC RBC AUTO-ENTMCNC: 33.2 G/DL (ref 31–37)
MCV RBC AUTO: 89 FL (ref 80–100)
MONOCYTES # BLD AUTO: 0.3 K/UL (ref 0–1)
MONOCYTES NFR BLD AUTO: 4 %
NEUTROPHILS # BLD AUTO: 5 K/UL (ref 1.3–7.7)
NEUTROPHILS NFR BLD AUTO: 70 %
PLATELET # BLD AUTO: 240 K/UL (ref 150–450)
POTASSIUM SERPL-SCNC: 4.2 MMOL/L (ref 3.5–5.1)
PROT SERPL-MCNC: 5.3 G/DL (ref 6.3–8.2)
SODIUM SERPL-SCNC: 135 MMOL/L (ref 137–145)
WBC # BLD AUTO: 7.1 K/UL (ref 3.8–10.6)

## 2022-07-01 RX ADMIN — IBUPROFEN SCH MG: 600 TABLET ORAL at 18:22

## 2022-07-01 RX ADMIN — DOCUSATE SODIUM AND SENNOSIDES SCH EACH: 50; 8.6 TABLET ORAL at 08:24

## 2022-07-01 RX ADMIN — POTASSIUM CHLORIDE SCH: 14.9 INJECTION, SOLUTION INTRAVENOUS at 11:04

## 2022-07-01 RX ADMIN — ACETAMINOPHEN SCH MG: 500 TABLET ORAL at 14:53

## 2022-07-01 RX ADMIN — IBUPROFEN SCH: 600 TABLET ORAL at 05:30

## 2022-07-01 RX ADMIN — ACETAMINOPHEN SCH MG: 500 TABLET ORAL at 02:52

## 2022-07-01 RX ADMIN — ACETAMINOPHEN SCH MG: 500 TABLET ORAL at 20:44

## 2022-07-01 RX ADMIN — ENOXAPARIN SODIUM SCH MG: 40 INJECTION SUBCUTANEOUS at 20:36

## 2022-07-01 RX ADMIN — IBUPROFEN SCH: 600 TABLET ORAL at 11:05

## 2022-07-01 RX ADMIN — KETOROLAC TROMETHAMINE PRN MG: 15 INJECTION, SOLUTION INTRAMUSCULAR; INTRAVENOUS at 05:40

## 2022-07-01 RX ADMIN — DOCUSATE SODIUM AND SENNOSIDES SCH: 50; 8.6 TABLET ORAL at 20:46

## 2022-07-01 RX ADMIN — ACETAMINOPHEN SCH MG: 500 TABLET ORAL at 09:18

## 2022-07-01 NOTE — CT
EXAMINATION TYPE: CT chest angio for PE

CT DLP: 1188 mGycm, Automated exposure control for dose reduction was used.

 

DATE OF EXAM: 7/1/2022 8:05 PM

 

COMPARISON: Chest radiograph same day.

 

 

CLINICAL INDICATION:Female, 26 years old with history of elevated D dimers; elevated D dimer

 

TECHNIQUE/CONTRAST: 

CTA scan of the thorax is performed with IV Contrast, patient injected with 100ml mL of Isovue 370, p
ulmonary embolism protocol.  MIP images are created and reviewed.  

 

FINDINGS: 

 

Pulmonary Artery: There is no evidence for a filling defect within the pulmonary vasculature to sugge
st acute pulmonary embolism.  The pulmonary artery is at the upper limits of normal measuring 3.4 cm.
 

Lungs/Pleura: No evidence of focal consolidation, pleural effusion or pneumothorax. 

Airway: Large airways are patent.

Heart: The heart is mildly enlarged for size..

Vasculature: No evidence of aortic aneurysm.

Mediastinum: No gross evidence of adenopathy.

Musculoskeletal: No acute osseous abnormalities. Mild multilevel disc degeneration changes throughout
 the spine.

Soft Tissues: Unremarkable.

Lower neck: No significant findings.

Upper Abdomen: No significant findings.

 

IMPRESSION:

1.   No evidence of pulmonary embolism.

2.  Pulmonary hypertension suggested.

## 2022-07-01 NOTE — P.PN
Progress Note - Text





  640am


26-year-old female status post  with spinal Duramorph.  Patient seen 

and evaluated for postop pain control, she has a VAS of 5 with complaints of 

pruritus.  Pruritus should subside in 24 hours, no other anesthesia related 

issue

## 2022-07-01 NOTE — XR
EXAMINATION TYPE: XR chest 2V

 

DATE OF EXAM: 7/1/2022

 

COMPARISON: 3/26/2021

 

HISTORY: 26-year-old female positive COVID, decreased pulse ox.

 

TECHNIQUE:  Frontal and lateral views

 

FINDINGS:  

Heart normal size allowing for AP technique. Aorta and pulmonary vasculature are within normal limits
. Minimal peribronchial cuffing is noted. Some strandy atelectasis in the lower lungs. No aruna conso
lidation or pleural effusion.

 

 

IMPRESSION:  

Minimal peribronchial cuffing could reflect bronchitis or asthma. No focal infiltrate.

## 2022-07-01 NOTE — P.PNOBGPC
Subjective





- Subjective


Principal diagnosis: Status post repeat  section postoperative day 1


Interval history: 





Patient is doing okay.  She is ambulating.  She is breast-feeding.  Lochia is 

decreasing.  Her pain is overall fairly well controlled.  She is passing flatus 

but no bowel movement yet.


Patient reports: Reports appetite normal, Reports voiding normally, Reports pain

well controlled, Reports ambulating normally


[place]: doing well, nursing well





Objective





- Vital Signs


Latest vital signs: 


                                   Vital Signs











  Temp Pulse Resp BP Pulse Ox


 


 22 07:59  98.6 F  68  18  109/58  96


 


 22 03:49  97.4 F L  74  14  104/52  97


 


 22 00:00   62  19  


 


 22 23:03  97.2 F L  62  19  96/52 


 


 22 21:00    16   96


 


 22 20:00  97.5 F L  60  16  102/58  96


 


 22 19:00    16  


 


 22 17:00    16   95


 


 22 16:00  96.8 F L  65  16  100/54  95


 


 22 15:00    16   95


 


 22 13:07      98


 


 22 13:00    16  


 


 22 12:00  96.8 F L  78  16  110/55  98


 


 22 11:41    16   96


 


 22 11:00  96.5 F L  72  16  110/56  96


 


 22 10:30   71  16  100/56  97


 


 22 10:00   77  16  106/55  97


 


 22 09:45   96  16  96/53  97


 


 22 09:41    16   100


 


 22 09:30   69  16  120/59  98


 


 22 09:15   83  16  117/61  100


 


 22 09:00  96.6 F L  81  16  119/59  100








                                Intake and Output











 22





 22:59 06:59 14:59


 


Intake Total 1680 720 


 


Output Total 2000 400 


 


Balance -320 320 


 


Intake:   


 


  IV 1200  


 


  Oral 480 720 


 


Output:   


 


  Urine 2000 400 


 


Other:   


 


  # Voids   2














- Exam


Abdomen: Present: normal appearance, soft (Positive bowel sounds 4).  Absent: 

distention, tenderness


Incision: Present: normal, dry, intact.  Absent: erythematous


Uterus: Present: normal, firm.  Absent: tenderness





Assessment and Plan


Assessment: 





Status post repeat low transverse  section postoperative day #1


Positive COVID-19


Plan: 





Continue postoperative care.  Anticipate possible discharge home tomorrow.

## 2022-07-01 NOTE — P.CONS
History of Present Illness





- Reason for Consult


Consult date: 22





- History of Present Illness


Consulting physician: Obstetric








Reason for consult: COVID 19 infection











History of present illness:


26-year-old female without significant past medical history except for obesity. 

Had  yesterday.  She was tested positive for COVID 19 infection the 

patient reported upper respiratory tract like symptoms for the past 5 days.  She

denies any chest pain or shortness of breath.  Patient satting 97% room air.  

Chest x-ray negative for infiltrate.  Hospitalist service consulted for medical 

management.  CRP and D dimers and CMP ordered pending result.  Patient denies 

nausea vomiting or abdominal pain.  RN stated that the patient pulse ox dropped 

to 93% when she was sleeping.  Currently SpO2 is 97% on room air patient is 

awake alert oriented 3.  The father and the baby present at the same room.











Review of system:All 14 review of systems evaluated and all negative except for 

above.








Physical examination:


General: non toxic, no distress, appears at stated age.  Obese


Derm: warm, dry


Head: atraumatic, normocephalic, symmetric


Eyes: EOMI, no lid lag, anicteric sclera


Mouth: no lip lesion, mucus membranes moist


Cardiovascular: S1S2 reg, no murmur, positive posterior tibial pulse bilateral, 


Lungs: CTA bilateral, no rhonchi, no rales , no accessory muscle use


Abdominal: soft,  nontender to palpation, no guarding, no appreciable 

organomegaly


Ext: no gross muscle atrophy, no edema, no contractures


Neuro:  CN II-XI grossly intact, no focal neuro deficits


Psych: Alert, oriented, appropriate affect 








Assessment and plan





#Status post 


-Management per obstetric and gynecology








#Positive COVID 19 infection


-Symptoms or 5 days


-The patient stated that her symptoms improved after 


-pulse ox is 97% room air


-Chest x-ray negative for pneumonia


-Check CRP and D dimers


-Patient denies any chest pain or shortness of breath


-Continue observation


-If d-dimer is elevated then CTA of the chest is recommended








#Super morbid obesity BMI 41





#DVT prophylaxis with Lovenox











Past Medical History


Past Medical History: No Reported History


Additional Past Medical History / Comment(s): anxiety


History of Any Multi-Drug Resistant Organisms: None Reported


Past Surgical History:  Section, Orthopedic Surgery


Past Anesthesia/Blood Transfusion Reactions: No Reported Reaction


Additional Past Anesthesia/Blood Transfusion Reaction / Comm: no hx blood 

transfusion


Past Psychological History: Anxiety


Smoking Status: Vaper


Past Alcohol Use History: None Reported


Additional Past Alcohol Use History / Comment(s): started smoking 18,<1/2ppd


Past Drug Use History: None Reported





- Past Family History


  ** Mother


Family Medical History: No Reported History





  ** Father


Family Medical History: No Reported History





Medications and Allergies


                                Home Medications











 Medication  Instructions  Recorded  Confirmed  Type


 


D-Methorphan/PE/Acetaminophen 2 tab PO AS DIRECTED PRN 22 History





[Tylenol Cold Multi-Symp Caplet]    








                                    Allergies











Allergy/AdvReac Type Severity Reaction Status Date / Time


 


No Known Allergies Allergy   Verified 22 06:19














Physical Exam


Vitals: 


                                   Vital Signs











  Temp Pulse Resp BP Pulse Ox


 


 22 15:36      98


 


 22 15:05  98.7 F  74  20  102/51  93 L


 


 22 07:59  98.6 F  68  18  109/58  96


 


 22 03:49  97.4 F L  74  14  104/52  97


 


 22 00:00   62  19  


 


 22 23:03  97.2 F L  62  19  96/52 


 


 22 21:00    16   96


 


 22 20:00  97.5 F L  60  16  102/58  96


 


 22 19:00    16  


 


 22 17:00    16   95








                                Intake and Output











 22





 06:59 14:59 22:59


 


Intake Total 720  


 


Output Total 400  


 


Balance 320  


 


Intake:   


 


  Oral 720  


 


Output:   


 


  Urine 400  


 


Other:   


 


  # Voids  2 2














Results


CBC & Chem 7: 


                                 22 08:56





Labs: 


                  Abnormal Lab Results - Last 24 Hours (Table)











  22 Range/Units





  08:56 


 


RBC  2.86 L  (3.80-5.40)  m/uL


 


Hgb  8.4 L  (11.4-16.0)  gm/dL


 


Hct  25.5 L  (34.0-46.0)  %

## 2022-07-02 VITALS
DIASTOLIC BLOOD PRESSURE: 50 MMHG | RESPIRATION RATE: 15 BRPM | TEMPERATURE: 97 F | HEART RATE: 69 BPM | SYSTOLIC BLOOD PRESSURE: 94 MMHG

## 2022-07-02 LAB
ALBUMIN SERPL-MCNC: 2.7 G/DL (ref 3.5–5)
ALP SERPL-CCNC: 167 U/L (ref 38–126)
ALT SERPL-CCNC: 17 U/L (ref 4–34)
ANION GAP SERPL CALC-SCNC: 4 MMOL/L
AST SERPL-CCNC: 27 U/L (ref 14–36)
BASOPHILS # BLD AUTO: 0 K/UL (ref 0–0.2)
BASOPHILS NFR BLD AUTO: 0 %
BUN SERPL-SCNC: 6 MG/DL (ref 7–17)
CALCIUM SPEC-MCNC: 8.1 MG/DL (ref 8.4–10.2)
CHLORIDE SERPL-SCNC: 109 MMOL/L (ref 98–107)
CO2 SERPL-SCNC: 23 MMOL/L (ref 22–30)
EOSINOPHIL # BLD AUTO: 0.1 K/UL (ref 0–0.7)
EOSINOPHIL NFR BLD AUTO: 1 %
ERYTHROCYTE [DISTWIDTH] IN BLOOD BY AUTOMATED COUNT: 2.95 M/UL (ref 3.8–5.4)
ERYTHROCYTE [DISTWIDTH] IN BLOOD: 14.5 % (ref 11.5–15.5)
GLUCOSE SERPL-MCNC: 74 MG/DL (ref 74–99)
HCT VFR BLD AUTO: 26.1 % (ref 34–46)
HGB BLD-MCNC: 8.5 GM/DL (ref 11.4–16)
LYMPHOCYTES # SPEC AUTO: 1.4 K/UL (ref 1–4.8)
LYMPHOCYTES NFR SPEC AUTO: 13 %
MCH RBC QN AUTO: 28.9 PG (ref 25–35)
MCHC RBC AUTO-ENTMCNC: 32.7 G/DL (ref 31–37)
MCV RBC AUTO: 88.5 FL (ref 80–100)
MONOCYTES # BLD AUTO: 0.4 K/UL (ref 0–1)
MONOCYTES NFR BLD AUTO: 4 %
NEUTROPHILS # BLD AUTO: 8.9 K/UL (ref 1.3–7.7)
NEUTROPHILS NFR BLD AUTO: 82 %
PLATELET # BLD AUTO: 276 K/UL (ref 150–450)
POTASSIUM SERPL-SCNC: 4 MMOL/L (ref 3.5–5.1)
PROT SERPL-MCNC: 5.6 G/DL (ref 6.3–8.2)
SODIUM SERPL-SCNC: 136 MMOL/L (ref 137–145)
WBC # BLD AUTO: 10.9 K/UL (ref 3.8–10.6)

## 2022-07-02 RX ADMIN — ENOXAPARIN SODIUM SCH MG: 40 INJECTION SUBCUTANEOUS at 08:38

## 2022-07-02 RX ADMIN — ACETAMINOPHEN SCH MG: 500 TABLET ORAL at 08:38

## 2022-07-02 RX ADMIN — IBUPROFEN SCH MG: 600 TABLET ORAL at 12:19

## 2022-07-02 RX ADMIN — DOCUSATE SODIUM AND SENNOSIDES SCH EACH: 50; 8.6 TABLET ORAL at 08:38

## 2022-07-02 RX ADMIN — IBUPROFEN SCH: 600 TABLET ORAL at 01:10

## 2022-07-02 RX ADMIN — IBUPROFEN SCH MG: 600 TABLET ORAL at 04:40

## 2022-07-02 RX ADMIN — ACETAMINOPHEN SCH: 500 TABLET ORAL at 01:12

## 2022-07-02 NOTE — P.DS
Providers


Date of admission: 


22 06:00





Expected date of discharge: 22


Attending physician: 


Damaris Bauer





Consults: 





                                        





22 15:40


Consult Physician Stat 


   Consulting Provider: Corey Wheat


   Consult Reason/Comments: postop C/Sday1 covid+ symp 1 week O2 sats 90 -93 rm 

air lungs dim


                                                wcx


   Do you want consulting provider notified?: Yes











Primary care physician: 


Stated None





Hospital Course: 





This is a 26-year-old female  3 para 2 at 39-0/7 weeks who presented for 

repeat scheduled  section on 2022.  Upon arrival she did have some

congestion symptoms and therefore a COVID-19 test was performed.  After her 

spinal anesthetic was already in, the results came back positive on her COVID-19

test.  This spinal however did not work for pain control and therefore she 

underwent a general anesthesia for her repeat  section on 2022.  

She delivered a viable male infant with Apgar scores of 9 at 1 minute and 9 at 5

minutes and infant weight of 8 lbs. 5 oz.  Her postpartum and postoperative 

course initially was uncomplicated however on post operative day #1, she started

having lower oxygen stats at 93%.  Medicine was consulted and x-ray and CT were 

performed.  It was negative for pulmonary embolism.  She was encouraged to keep 

doing her incentive spirometry and her sats have become normal since that time. 

She denies any cough or increased congestion.  Lochia is decreasing.  She is 

passing flatus and bowel movement.  Her pain has been fairly well controlled 

mostly with ibuprofen and Tylenol but she occasionally does require oxycodone.  

She is breast-feeding.  Vital signs are stable.  Abdomen is soft with positive 

bowel sounds 4.  Incision is clean dry and intact with staples in place.  

Extremities show negative Homans.  Impression is status post repeat low 

transverse  section postoperative day #2.  Plan is to discharge home 

today as long this she is cleared by medicine.  We'll give prescriptions for 

ibuprofen and a few oxycodone.  She is counseled regarding opioid use and has 

signed a consent form.  She is advised to follow up Dr. Bauer in approximately 

1 week for a postoperative check and in 6 weeks for postpartum check.  She is 

advised to call the office if she has any further questions or concerns prior to

her appointment time.


Procedures: 





Repeat low transverse  section on 2022


Patient Condition at Discharge: Stable





Plan - Discharge Summary


New Discharge Prescriptions: 


New


   Ibuprofen [Motrin] 600 mg PO Q6H #60 tab


   oxyCODONE HCL [OxyIR] 5 mg PO Q4HR PRN #10 tab


     PRN Reason: Pain Scale 4 - 6





No Action


   D-Methorphan/PE/Acetaminophen [Tylenol Cold Multi-Symp Caplet] 2 tab PO AS 

DIRECTED PRN


     PRN Reason: Cold Symptoms


Discharge Medication List





D-Methorphan/PE/Acetaminophen [Tylenol Cold Multi-Symp Caplet] 2 tab PO AS 

DIRECTED PRN 22 [History]


Ibuprofen [Motrin] 600 mg PO Q6H #60 tab 22 [Rx]


oxyCODONE HCL [OxyIR] 5 mg PO Q4HR PRN #10 tab 22 [Rx]








Follow up Appointment(s)/Referral(s): 


Damaris Bauer DO [Doctor of Osteopathic Medicine] - 22 3:45 pm (Post Op

Appointment 2022 at 1:30p.m.)


Activity/Diet/Wound Care/Special Instructions: 


Postpartum Instructions





1.  Do not begin any exercise program for 3 weeks.


2.  Do not resume sexual relations for 3 weeks or longer if uncomfortable.


3.  You may take tub baths or showers at any time.


4.  You may use tampons if desired after 3 weeks.


5.  Keep the area of episiotomy (stitches) clean and dry.


6.  If you are not nursing, wear a good fitting, supportive bra during the day 

and limit fluid intake for at least 1 week to prevent breast engorgement.


7.  Call the office, [phone], within the next week to make appointment for your

6 week checkup if it has not already been made.


8.  Report any of the following occurrences to the doctor promptly:


     a.  Heavy, excessive bleeding


     b.  Chills, fever


     c.  Burning or frequency of urination


     d.  Pain or redness and breasts if nursing


     e.  Increasing pain or swelling in episiotomy (stitches).











In addition to the above instructions, the following additional should be 

followed:


1.  No heavy lifting or straining (exercising) until after 6 week checkup.


2.  Keep abdominal incision clean and dry: You may wear a dressing if more 

comfortable.


3.  Make office appointment for 10 days after going home or as instructed by her

doctor.


Discharge Disposition: HOME SELF-CARE

## 2022-07-02 NOTE — XR
EXAMINATION TYPE: XR chest 1V portable

 

DATE OF EXAM: 7/2/2022 6:37 AM

 

COMPARISON: Chest radiographs from 1/7/2022 and CT chest 7/1/2022.

 

TECHNIQUE: XR chest 1V portable Frontal view of the chest.

 

CLINICAL INDICATION:Female, 26 years old with history of POSITIVE COVID R/O PNA; 

 

FINDINGS: 

Lungs/Pleura: There is no evidence of pleural effusion, focal consolidation, or pneumothorax.  

Pulmonary vascularity: Unremarkable.

Heart/mediastinum: Cardiomediastinal silhouette is unremarkable.

Musculoskeletal: No acute osseous pathology.

 

IMPRESSION: 

No focal consolidation. Unchanged exam from prior.

## 2022-07-02 NOTE — P.PN
Subjective


Progress Note Date: 22








History of present illness:


26-year-old female without significant past medical history except for obesity. 

Had  yesterday.  She was tested positive for COVID 19 infection the 

patient reported upper respiratory tract like symptoms for the past 5 days.  She

denies any chest pain or shortness of breath.  Patient satting 97% room air.  

Chest x-ray negative for infiltrate.  Hospitalist service consulted for medical 

management.  CRP and D dimers and CMP ordered pending result.  Patient denies 

nausea vomiting or abdominal pain.  RN stated that the patient pulse ox dropped 

to 93% when she was sleeping.  Currently SpO2 is 97% on room air patient is 

awake alert oriented 3.  The father and the baby present at the same room.











Interval history:


Patient was examined at the bedside.  She denies any chest pain or shortness of 

breath.  Computed tomography scan of the chest was negative for PE.  Patient 

still satting 96% room air.  She denies any chest pain








Physical examination:


General: non toxic, no distress, appears at stated age.  Obese


Derm: warm, dry


Head: atraumatic, normocephalic, symmetric


Eyes: EOMI, no lid lag, anicteric sclera


Mouth: no lip lesion, mucus membranes moist


Cardiovascular: S1S2 reg, no murmur, positive posterior tibial pulse bilateral, 


Lungs: CTA bilateral, no rhonchi, no rales , no accessory muscle use


Abdominal: soft,  nontender to palpation, no guarding, no appreciable 

organomegaly


Ext: no gross muscle atrophy, no edema, no contractures


Neuro:  CN II-XI grossly intact, no focal neuro deficits


Psych: Alert, oriented, appropriate affect 








Assessment and plan





#Status post 


-Management per obstetric and gynecology








#Positive COVID 19 infection


-Symptoms over 5 days


-The patient stated that her symptoms improved after 


-pulse ox is 97% room air


-Chest x-ray negative for pneumonia


-Check CRP and D dimers


-Patient denies any chest pain or shortness of breath


-D-dimer was elevated so CT of the chest ordered.  CT of the chest was negative 

for PE.


-CT of the chest was suggestive pulmonary hypertension.  Patient was instructed 

to follow-up with primary care physician for outpatient cardiology or pulmonolog

y evaluation








#Super morbid obesity BMI 41





#DVT prophylaxis with Lovenox








Objective





- Vital Signs


Vital signs: 


                                   Vital Signs











Temp  97.0 F L  22 08:00


 


Pulse  69   22 08:00


 


Resp  15   22 08:00


 


BP  94/50   22 08:00


 


Pulse Ox  96   22 08:00


 


FiO2      








                                 Intake & Output











 22





 18:59 06:59 18:59


 


Other:   


 


  # Voids 2 1 1














- Labs


CBC & Chem 7: 


                                 22 06:46





                                 22 06:46


Labs: 


                  Abnormal Lab Results - Last 24 Hours (Table)











  22 Range/Units





  16:26 16:26 06:46 


 


WBC    10.9 H  (3.8-10.6)  k/uL


 


RBC    2.95 L  (3.80-5.40)  m/uL


 


Hgb    8.5 L  (11.4-16.0)  gm/dL


 


Hct    26.1 L  (34.0-46.0)  %


 


Neutrophils #    8.9 H  (1.3-7.7)  k/uL


 


D-Dimer  1.18 H    (<0.60)  mg/L FEU


 


Sodium   135 L   (137-145)  mmol/L


 


Chloride   108 H   ()  mmol/L


 


BUN   6 L   (7-17)  mg/dL


 


Calcium   8.2 L   (8.4-10.2)  mg/dL


 


Alkaline Phosphatase   155 H   ()  U/L


 


C-Reactive Protein   4.6 H   (<1.0)  mg/dL


 


Total Protein   5.3 L   (6.3-8.2)  g/dL


 


Albumin   2.5 L   (3.5-5.0)  g/dL














  22 Range/Units





  06:46 


 


WBC   (3.8-10.6)  k/uL


 


RBC   (3.80-5.40)  m/uL


 


Hgb   (11.4-16.0)  gm/dL


 


Hct   (34.0-46.0)  %


 


Neutrophils #   (1.3-7.7)  k/uL


 


D-Dimer   (<0.60)  mg/L FEU


 


Sodium  136 L  (137-145)  mmol/L


 


Chloride  109 H  ()  mmol/L


 


BUN  6 L  (7-17)  mg/dL


 


Calcium  8.1 L  (8.4-10.2)  mg/dL


 


Alkaline Phosphatase  167 H  ()  U/L


 


C-Reactive Protein   (<1.0)  mg/dL


 


Total Protein  5.6 L  (6.3-8.2)  g/dL


 


Albumin  2.7 L  (3.5-5.0)  g/dL

## 2022-07-15 ENCOUNTER — HOSPITAL ENCOUNTER (EMERGENCY)
Dept: HOSPITAL 47 - EC | Age: 27
LOS: 1 days | Discharge: HOME | End: 2022-07-16
Payer: COMMERCIAL

## 2022-07-15 VITALS — RESPIRATION RATE: 18 BRPM

## 2022-07-15 DIAGNOSIS — G89.18: ICD-10-CM

## 2022-07-15 DIAGNOSIS — F17.290: ICD-10-CM

## 2022-07-15 PROCEDURE — 87040 BLOOD CULTURE FOR BACTERIA: CPT

## 2022-07-15 PROCEDURE — 85025 COMPLETE CBC W/AUTO DIFF WBC: CPT

## 2022-07-15 PROCEDURE — 74176 CT ABD & PELVIS W/O CONTRAST: CPT

## 2022-07-15 PROCEDURE — 36415 COLL VENOUS BLD VENIPUNCTURE: CPT

## 2022-07-15 PROCEDURE — 99284 EMERGENCY DEPT VISIT MOD MDM: CPT

## 2022-07-15 PROCEDURE — 96367 TX/PROPH/DG ADDL SEQ IV INF: CPT

## 2022-07-15 PROCEDURE — 96365 THER/PROPH/DIAG IV INF INIT: CPT

## 2022-07-15 PROCEDURE — 83690 ASSAY OF LIPASE: CPT

## 2022-07-15 PROCEDURE — 80053 COMPREHEN METABOLIC PANEL: CPT

## 2022-07-15 PROCEDURE — 82150 ASSAY OF AMYLASE: CPT

## 2022-07-16 VITALS — HEART RATE: 64 BPM | DIASTOLIC BLOOD PRESSURE: 64 MMHG | SYSTOLIC BLOOD PRESSURE: 111 MMHG | TEMPERATURE: 96.9 F

## 2022-07-16 LAB
ALBUMIN SERPL-MCNC: 4.4 G/DL (ref 3.5–5)
ALP SERPL-CCNC: 120 U/L (ref 38–126)
ALT SERPL-CCNC: 32 U/L (ref 4–34)
AMYLASE SERPL-CCNC: 75 U/L (ref 30–110)
ANION GAP SERPL CALC-SCNC: 12 MMOL/L
AST SERPL-CCNC: 34 U/L (ref 14–36)
BASOPHILS # BLD AUTO: 0 K/UL (ref 0–0.2)
BASOPHILS NFR BLD AUTO: 0 %
BUN SERPL-SCNC: 11 MG/DL (ref 7–17)
CALCIUM SPEC-MCNC: 9.4 MG/DL (ref 8.4–10.2)
CHLORIDE SERPL-SCNC: 107 MMOL/L (ref 98–107)
CO2 SERPL-SCNC: 23 MMOL/L (ref 22–30)
EOSINOPHIL # BLD AUTO: 0.1 K/UL (ref 0–0.7)
EOSINOPHIL NFR BLD AUTO: 2 %
ERYTHROCYTE [DISTWIDTH] IN BLOOD BY AUTOMATED COUNT: 4.17 M/UL (ref 3.8–5.4)
ERYTHROCYTE [DISTWIDTH] IN BLOOD: 14.5 % (ref 11.5–15.5)
GLUCOSE SERPL-MCNC: 77 MG/DL (ref 74–99)
HCT VFR BLD AUTO: 37.1 % (ref 34–46)
HGB BLD-MCNC: 11.8 GM/DL (ref 11.4–16)
LIPASE SERPL-CCNC: 153 U/L (ref 23–300)
LYMPHOCYTES # SPEC AUTO: 2.4 K/UL (ref 1–4.8)
LYMPHOCYTES NFR SPEC AUTO: 34 %
MCH RBC QN AUTO: 28.4 PG (ref 25–35)
MCHC RBC AUTO-ENTMCNC: 31.9 G/DL (ref 31–37)
MCV RBC AUTO: 89.1 FL (ref 80–100)
MONOCYTES # BLD AUTO: 0.3 K/UL (ref 0–1)
MONOCYTES NFR BLD AUTO: 5 %
NEUTROPHILS # BLD AUTO: 3.9 K/UL (ref 1.3–7.7)
NEUTROPHILS NFR BLD AUTO: 56 %
PLATELET # BLD AUTO: 493 K/UL (ref 150–450)
POTASSIUM SERPL-SCNC: 4.4 MMOL/L (ref 3.5–5.1)
PROT SERPL-MCNC: 7.8 G/DL (ref 6.3–8.2)
SODIUM SERPL-SCNC: 142 MMOL/L (ref 137–145)
WBC # BLD AUTO: 6.9 K/UL (ref 3.8–10.6)

## 2022-07-16 NOTE — CT
EXAMINATION TYPE: CT abdomen pelvis wo con

 

DATE OF EXAM: 2022

 

COMPARISON: 2021

 

HISTORY: Abd pain post csection

 

CT DLP: 967.5 mGycm

Automated exposure control for dose reduction was used.

 

Images obtained from the diaphragm to the floor the pelvis with no contrast.

 

There is some mild subsegmental atelectasis left lung base. Heart size is normal. No pericardial effu
federica.

 

Liver spleen and stomach pancreas appear intact. Gallbladder is intact. Mild extra not dilated.

 

There is no adrenal mass. Kidneys of normal size and contour. No hydronephrosis. There is some minima
l 3 mm calcification posterior left kidney. No retroperitoneal adenopathy. Ureters are not dilated. T
he bladder distends smoothly. Uterus is enlarged consistent with recent pregnancy. No pelvic mass. No
 free fluid in the pelvis.

 

There is no mesenteric edema. No ascites or free air. No bowel obstruction. There is some mild fat st
randing in the subcutaneous tissues over the lower anterior abdomen from recent .

The lumbar vertebrae have normal alignment. Disc spaces are normal. No compression fracture. The bony
 pelvis is intact. Hip joints are intact. Appendix not clearly seen. No sign of thickened appendix.

IMPRESSION:

There is some mild postsurgical changes. No pathologic fluid collection. No complicating process.

 

Minimal subsegmental atelectasis left lung base.

## 2022-07-16 NOTE — ED
Recheck HPI





- General


Chief Complaint: Skin/Abscess/Foreign Body


Stated Complaint:  /Infection


Time Seen by Provider: 22 00:21


Source: patient


Mode of arrival: ambulatory


Limitations: no limitations





- Related Data


                                Home Medications











 Medication  Instructions  Recorded  Confirmed


 


D-Methorphan/PE/Acetaminophen 2 tab PO AS DIRECTED PRN 22





[Tylenol Cold Multi-Symp Caplet]   








                                  Previous Rx's











 Medication  Instructions  Recorded


 


Ibuprofen [Motrin] 600 mg PO Q6H #60 tab 22


 


oxyCODONE HCL [OxyIR] 5 mg PO Q4HR PRN #10 tab 22











                                    Allergies











Allergy/AdvReac Type Severity Reaction Status Date / Time


 


No Known Allergies Allergy   Verified 07/15/22 22:35














Review of Systems


ROS Statement: 


Those systems with pertinent positive or pertinent negative responses have been 

documented in the HPI.





ROS Other: All systems not noted in ROS Statement are negative.





Past Medical History


Past Medical History: No Reported History


Additional Past Medical History / Comment(s): anxiety


History of Any Multi-Drug Resistant Organisms: None Reported


Past Surgical History:  Section, Orthopedic Surgery


Past Anesthesia/Blood Transfusion Reactions: No Reported Reaction


Additional Past Anesthesia/Blood Transfusion Reaction / Comment(s): no hx blood 

transfusion


Past Psychological History: Anxiety


Smoking Status: Vaper


Past Alcohol Use History: None Reported


Past Drug Use History: None Reported





- Past Family History


  ** Mother


Family Medical History: No Reported History





  ** Father


Family Medical History: No Reported History





General Exam


Limitations: no limitations





Course


                                   Vital Signs











  07/15/22 07/16/22





  22:33 02:04


 


Temperature 98.3 F 


 


Pulse Rate 82 61


 


Respiratory 18 18





Rate  


 


Blood Pressure 118/76 96/51


 


O2 Sat by Pulse 98 99





Oximetry  














Medical Decision Making





- Lab Data


Result diagrams: 


                                 22 01:46





                                 22 01:46


                                   Lab Results











  22 Range/Units





  01:46 01:46 


 


WBC  6.9   (3.8-10.6)  k/uL


 


RBC  4.17   (3.80-5.40)  m/uL


 


Hgb  11.8  D   (11.4-16.0)  gm/dL


 


Hct  37.1   (34.0-46.0)  %


 


MCV  89.1   (80.0-100.0)  fL


 


MCH  28.4   (25.0-35.0)  pg


 


MCHC  31.9   (31.0-37.0)  g/dL


 


RDW  14.5   (11.5-15.5)  %


 


Plt Count  493 H   (150-450)  k/uL


 


MPV  7.2   


 


Neutrophils %  56   %


 


Lymphocytes %  34   %


 


Monocytes %  5   %


 


Eosinophils %  2   %


 


Basophils %  0   %


 


Neutrophils #  3.9   (1.3-7.7)  k/uL


 


Lymphocytes #  2.4   (1.0-4.8)  k/uL


 


Monocytes #  0.3   (0-1.0)  k/uL


 


Eosinophils #  0.1   (0-0.7)  k/uL


 


Basophils #  0.0   (0-0.2)  k/uL


 


Hypochromasia  Slight   


 


Sodium   142  (137-145)  mmol/L


 


Potassium   4.4  (3.5-5.1)  mmol/L


 


Chloride   107  ()  mmol/L


 


Carbon Dioxide   23  (22-30)  mmol/L


 


Anion Gap   12  mmol/L


 


BUN   11  (7-17)  mg/dL


 


Creatinine   0.76  (0.52-1.04)  mg/dL


 


Est GFR (CKD-EPI)AfAm   >90  (>60 ml/min/1.73 sqM)  


 


Est GFR (CKD-EPI)NonAf   >90  (>60 ml/min/1.73 sqM)  


 


Glucose   77  (74-99)  mg/dL


 


Calcium   9.4  (8.4-10.2)  mg/dL


 


Total Bilirubin   0.4  (0.2-1.3)  mg/dL


 


AST   34  (14-36)  U/L


 


ALT   32  (4-34)  U/L


 


Alkaline Phosphatase   120  ()  U/L


 


Total Protein   7.8  (6.3-8.2)  g/dL


 


Albumin   4.4  (3.5-5.0)  g/dL


 


Amylase   75  ()  U/L


 


Lipase   153  ()  U/L














Disposition


Clinical Impression: 


 Postoperative pain





Disposition: HOME SELF-CARE


Condition: Good


Instructions (If sedation given, give patient instructions):  Pain Management 

After Surgery (DC)


Is patient prescribed a controlled substance at d/c from ED?: No


Referrals: 


None,Stated [Primary Care Provider] - 1-2 days